# Patient Record
Sex: MALE | Race: ASIAN | NOT HISPANIC OR LATINO
[De-identification: names, ages, dates, MRNs, and addresses within clinical notes are randomized per-mention and may not be internally consistent; named-entity substitution may affect disease eponyms.]

---

## 2023-11-07 PROBLEM — Z00.00 ENCOUNTER FOR PREVENTIVE HEALTH EXAMINATION: Status: ACTIVE | Noted: 2023-11-07

## 2023-11-08 PROBLEM — K29.50 OTHER CHRONIC GASTRITIS: Status: ACTIVE | Noted: 2023-11-08

## 2023-11-08 RX ORDER — OMEPRAZOLE 40 MG/1
40 CAPSULE, DELAYED RELEASE ORAL
Qty: 30 | Refills: 0 | Status: ACTIVE | COMMUNITY

## 2023-11-08 RX ORDER — SUCRALFATE 1 G/1
1 TABLET ORAL
Refills: 0 | Status: ACTIVE | COMMUNITY

## 2023-11-10 ENCOUNTER — APPOINTMENT (OUTPATIENT)
Dept: THORACIC SURGERY | Facility: CLINIC | Age: 63
End: 2023-11-10
Payer: MEDICAID

## 2023-11-10 ENCOUNTER — OUTPATIENT (OUTPATIENT)
Dept: OUTPATIENT SERVICES | Facility: HOSPITAL | Age: 63
LOS: 1 days | End: 2023-11-10
Payer: COMMERCIAL

## 2023-11-10 VITALS
OXYGEN SATURATION: 95 % | HEART RATE: 70 BPM | SYSTOLIC BLOOD PRESSURE: 108 MMHG | DIASTOLIC BLOOD PRESSURE: 74 MMHG | BODY MASS INDEX: 23.79 KG/M2 | WEIGHT: 157 LBS | HEIGHT: 68 IN | TEMPERATURE: 97.8 F | RESPIRATION RATE: 16 BRPM

## 2023-11-10 DIAGNOSIS — Z86.69 PERSONAL HISTORY OF OTHER DISEASES OF THE NERVOUS SYSTEM AND SENSE ORGANS: ICD-10-CM

## 2023-11-10 DIAGNOSIS — C16.9 MALIGNANT NEOPLASM OF STOMACH, UNSPECIFIED: ICD-10-CM

## 2023-11-10 DIAGNOSIS — Z87.891 PERSONAL HISTORY OF NICOTINE DEPENDENCE: ICD-10-CM

## 2023-11-10 DIAGNOSIS — Z01.818 ENCOUNTER FOR OTHER PREPROCEDURAL EXAMINATION: ICD-10-CM

## 2023-11-10 DIAGNOSIS — K29.50 UNSPECIFIED CHRONIC GASTRITIS W/OUT BLEEDING: ICD-10-CM

## 2023-11-10 LAB
ALBUMIN SERPL ELPH-MCNC: 4 G/DL — SIGNIFICANT CHANGE UP (ref 3.3–5)
ALBUMIN SERPL ELPH-MCNC: 4 G/DL — SIGNIFICANT CHANGE UP (ref 3.3–5)
ALP SERPL-CCNC: 56 U/L — SIGNIFICANT CHANGE UP (ref 40–120)
ALP SERPL-CCNC: 56 U/L — SIGNIFICANT CHANGE UP (ref 40–120)
ALT FLD-CCNC: 15 U/L — SIGNIFICANT CHANGE UP (ref 10–45)
ALT FLD-CCNC: 15 U/L — SIGNIFICANT CHANGE UP (ref 10–45)
ANION GAP SERPL CALC-SCNC: 9 MMOL/L — SIGNIFICANT CHANGE UP (ref 5–17)
ANION GAP SERPL CALC-SCNC: 9 MMOL/L — SIGNIFICANT CHANGE UP (ref 5–17)
APPEARANCE UR: CLEAR — SIGNIFICANT CHANGE UP
APPEARANCE UR: CLEAR — SIGNIFICANT CHANGE UP
APTT BLD: 32.7 SEC — SIGNIFICANT CHANGE UP (ref 24.5–35.6)
APTT BLD: 32.7 SEC — SIGNIFICANT CHANGE UP (ref 24.5–35.6)
AST SERPL-CCNC: 24 U/L — SIGNIFICANT CHANGE UP (ref 10–40)
AST SERPL-CCNC: 24 U/L — SIGNIFICANT CHANGE UP (ref 10–40)
BASOPHILS # BLD AUTO: 0.06 K/UL — SIGNIFICANT CHANGE UP (ref 0–0.2)
BASOPHILS # BLD AUTO: 0.06 K/UL — SIGNIFICANT CHANGE UP (ref 0–0.2)
BASOPHILS NFR BLD AUTO: 1.1 % — SIGNIFICANT CHANGE UP (ref 0–2)
BASOPHILS NFR BLD AUTO: 1.1 % — SIGNIFICANT CHANGE UP (ref 0–2)
BILIRUB SERPL-MCNC: 0.6 MG/DL — SIGNIFICANT CHANGE UP (ref 0.2–1.2)
BILIRUB SERPL-MCNC: 0.6 MG/DL — SIGNIFICANT CHANGE UP (ref 0.2–1.2)
BILIRUB UR-MCNC: NEGATIVE — SIGNIFICANT CHANGE UP
BILIRUB UR-MCNC: NEGATIVE — SIGNIFICANT CHANGE UP
BUN SERPL-MCNC: 14 MG/DL — SIGNIFICANT CHANGE UP (ref 7–23)
BUN SERPL-MCNC: 14 MG/DL — SIGNIFICANT CHANGE UP (ref 7–23)
CALCIUM SERPL-MCNC: 8.9 MG/DL — SIGNIFICANT CHANGE UP (ref 8.4–10.5)
CALCIUM SERPL-MCNC: 8.9 MG/DL — SIGNIFICANT CHANGE UP (ref 8.4–10.5)
CHLORIDE SERPL-SCNC: 103 MMOL/L — SIGNIFICANT CHANGE UP (ref 96–108)
CHLORIDE SERPL-SCNC: 103 MMOL/L — SIGNIFICANT CHANGE UP (ref 96–108)
CO2 SERPL-SCNC: 28 MMOL/L — SIGNIFICANT CHANGE UP (ref 22–31)
CO2 SERPL-SCNC: 28 MMOL/L — SIGNIFICANT CHANGE UP (ref 22–31)
COLOR SPEC: YELLOW — SIGNIFICANT CHANGE UP
COLOR SPEC: YELLOW — SIGNIFICANT CHANGE UP
CREAT SERPL-MCNC: 0.81 MG/DL — SIGNIFICANT CHANGE UP (ref 0.5–1.3)
CREAT SERPL-MCNC: 0.81 MG/DL — SIGNIFICANT CHANGE UP (ref 0.5–1.3)
DIFF PNL FLD: NEGATIVE — SIGNIFICANT CHANGE UP
DIFF PNL FLD: NEGATIVE — SIGNIFICANT CHANGE UP
EGFR: 99 ML/MIN/1.73M2 — SIGNIFICANT CHANGE UP
EGFR: 99 ML/MIN/1.73M2 — SIGNIFICANT CHANGE UP
EOSINOPHIL # BLD AUTO: 0.11 K/UL — SIGNIFICANT CHANGE UP (ref 0–0.5)
EOSINOPHIL # BLD AUTO: 0.11 K/UL — SIGNIFICANT CHANGE UP (ref 0–0.5)
EOSINOPHIL NFR BLD AUTO: 2 % — SIGNIFICANT CHANGE UP (ref 0–6)
EOSINOPHIL NFR BLD AUTO: 2 % — SIGNIFICANT CHANGE UP (ref 0–6)
GLUCOSE SERPL-MCNC: 77 MG/DL — SIGNIFICANT CHANGE UP (ref 70–99)
GLUCOSE SERPL-MCNC: 77 MG/DL — SIGNIFICANT CHANGE UP (ref 70–99)
GLUCOSE UR QL: NEGATIVE MG/DL — SIGNIFICANT CHANGE UP
GLUCOSE UR QL: NEGATIVE MG/DL — SIGNIFICANT CHANGE UP
HCT VFR BLD CALC: 45.4 % — SIGNIFICANT CHANGE UP (ref 39–50)
HCT VFR BLD CALC: 45.4 % — SIGNIFICANT CHANGE UP (ref 39–50)
HGB BLD-MCNC: 15.2 G/DL — SIGNIFICANT CHANGE UP (ref 13–17)
HGB BLD-MCNC: 15.2 G/DL — SIGNIFICANT CHANGE UP (ref 13–17)
IMM GRANULOCYTES NFR BLD AUTO: 0.5 % — SIGNIFICANT CHANGE UP (ref 0–0.9)
IMM GRANULOCYTES NFR BLD AUTO: 0.5 % — SIGNIFICANT CHANGE UP (ref 0–0.9)
INR BLD: 0.95 — SIGNIFICANT CHANGE UP (ref 0.85–1.18)
INR BLD: 0.95 — SIGNIFICANT CHANGE UP (ref 0.85–1.18)
KETONES UR-MCNC: NEGATIVE MG/DL — SIGNIFICANT CHANGE UP
KETONES UR-MCNC: NEGATIVE MG/DL — SIGNIFICANT CHANGE UP
LEUKOCYTE ESTERASE UR-ACNC: NEGATIVE — SIGNIFICANT CHANGE UP
LEUKOCYTE ESTERASE UR-ACNC: NEGATIVE — SIGNIFICANT CHANGE UP
LYMPHOCYTES # BLD AUTO: 1.21 K/UL — SIGNIFICANT CHANGE UP (ref 1–3.3)
LYMPHOCYTES # BLD AUTO: 1.21 K/UL — SIGNIFICANT CHANGE UP (ref 1–3.3)
LYMPHOCYTES # BLD AUTO: 21.8 % — SIGNIFICANT CHANGE UP (ref 13–44)
LYMPHOCYTES # BLD AUTO: 21.8 % — SIGNIFICANT CHANGE UP (ref 13–44)
MCHC RBC-ENTMCNC: 31.5 PG — SIGNIFICANT CHANGE UP (ref 27–34)
MCHC RBC-ENTMCNC: 31.5 PG — SIGNIFICANT CHANGE UP (ref 27–34)
MCHC RBC-ENTMCNC: 33.5 GM/DL — SIGNIFICANT CHANGE UP (ref 32–36)
MCHC RBC-ENTMCNC: 33.5 GM/DL — SIGNIFICANT CHANGE UP (ref 32–36)
MCV RBC AUTO: 94 FL — SIGNIFICANT CHANGE UP (ref 80–100)
MCV RBC AUTO: 94 FL — SIGNIFICANT CHANGE UP (ref 80–100)
MONOCYTES # BLD AUTO: 0.45 K/UL — SIGNIFICANT CHANGE UP (ref 0–0.9)
MONOCYTES # BLD AUTO: 0.45 K/UL — SIGNIFICANT CHANGE UP (ref 0–0.9)
MONOCYTES NFR BLD AUTO: 8.1 % — SIGNIFICANT CHANGE UP (ref 2–14)
MONOCYTES NFR BLD AUTO: 8.1 % — SIGNIFICANT CHANGE UP (ref 2–14)
NEUTROPHILS # BLD AUTO: 3.7 K/UL — SIGNIFICANT CHANGE UP (ref 1.8–7.4)
NEUTROPHILS # BLD AUTO: 3.7 K/UL — SIGNIFICANT CHANGE UP (ref 1.8–7.4)
NEUTROPHILS NFR BLD AUTO: 66.5 % — SIGNIFICANT CHANGE UP (ref 43–77)
NEUTROPHILS NFR BLD AUTO: 66.5 % — SIGNIFICANT CHANGE UP (ref 43–77)
NITRITE UR-MCNC: NEGATIVE — SIGNIFICANT CHANGE UP
NITRITE UR-MCNC: NEGATIVE — SIGNIFICANT CHANGE UP
NRBC # BLD: 0 /100 WBCS — SIGNIFICANT CHANGE UP (ref 0–0)
NRBC # BLD: 0 /100 WBCS — SIGNIFICANT CHANGE UP (ref 0–0)
PH UR: 8 — SIGNIFICANT CHANGE UP (ref 5–8)
PH UR: 8 — SIGNIFICANT CHANGE UP (ref 5–8)
PLATELET # BLD AUTO: 170 K/UL — SIGNIFICANT CHANGE UP (ref 150–400)
PLATELET # BLD AUTO: 170 K/UL — SIGNIFICANT CHANGE UP (ref 150–400)
POTASSIUM SERPL-MCNC: 3.6 MMOL/L — SIGNIFICANT CHANGE UP (ref 3.5–5.3)
POTASSIUM SERPL-MCNC: 3.6 MMOL/L — SIGNIFICANT CHANGE UP (ref 3.5–5.3)
POTASSIUM SERPL-SCNC: 3.6 MMOL/L — SIGNIFICANT CHANGE UP (ref 3.5–5.3)
POTASSIUM SERPL-SCNC: 3.6 MMOL/L — SIGNIFICANT CHANGE UP (ref 3.5–5.3)
PROT SERPL-MCNC: 7.2 G/DL — SIGNIFICANT CHANGE UP (ref 6–8.3)
PROT SERPL-MCNC: 7.2 G/DL — SIGNIFICANT CHANGE UP (ref 6–8.3)
PROT UR-MCNC: NEGATIVE MG/DL — SIGNIFICANT CHANGE UP
PROT UR-MCNC: NEGATIVE MG/DL — SIGNIFICANT CHANGE UP
PROTHROM AB SERPL-ACNC: 10.8 SEC — SIGNIFICANT CHANGE UP (ref 9.5–13)
PROTHROM AB SERPL-ACNC: 10.8 SEC — SIGNIFICANT CHANGE UP (ref 9.5–13)
RBC # BLD: 4.83 M/UL — SIGNIFICANT CHANGE UP (ref 4.2–5.8)
RBC # BLD: 4.83 M/UL — SIGNIFICANT CHANGE UP (ref 4.2–5.8)
RBC # FLD: 12.4 % — SIGNIFICANT CHANGE UP (ref 10.3–14.5)
RBC # FLD: 12.4 % — SIGNIFICANT CHANGE UP (ref 10.3–14.5)
SODIUM SERPL-SCNC: 140 MMOL/L — SIGNIFICANT CHANGE UP (ref 135–145)
SODIUM SERPL-SCNC: 140 MMOL/L — SIGNIFICANT CHANGE UP (ref 135–145)
SP GR SPEC: 1 — SIGNIFICANT CHANGE UP (ref 1–1.03)
SP GR SPEC: 1 — SIGNIFICANT CHANGE UP (ref 1–1.03)
UROBILINOGEN FLD QL: 0.2 MG/DL — SIGNIFICANT CHANGE UP (ref 0.2–1)
UROBILINOGEN FLD QL: 0.2 MG/DL — SIGNIFICANT CHANGE UP (ref 0.2–1)
WBC # BLD: 5.56 K/UL — SIGNIFICANT CHANGE UP (ref 3.8–10.5)
WBC # BLD: 5.56 K/UL — SIGNIFICANT CHANGE UP (ref 3.8–10.5)
WBC # FLD AUTO: 5.56 K/UL — SIGNIFICANT CHANGE UP (ref 3.8–10.5)
WBC # FLD AUTO: 5.56 K/UL — SIGNIFICANT CHANGE UP (ref 3.8–10.5)

## 2023-11-10 PROCEDURE — 81003 URINALYSIS AUTO W/O SCOPE: CPT

## 2023-11-10 PROCEDURE — 85610 PROTHROMBIN TIME: CPT

## 2023-11-10 PROCEDURE — 85025 COMPLETE CBC W/AUTO DIFF WBC: CPT

## 2023-11-10 PROCEDURE — 86901 BLOOD TYPING SEROLOGIC RH(D): CPT

## 2023-11-10 PROCEDURE — 36415 COLL VENOUS BLD VENIPUNCTURE: CPT

## 2023-11-10 PROCEDURE — 99205 OFFICE O/P NEW HI 60 MIN: CPT

## 2023-11-10 PROCEDURE — 80053 COMPREHEN METABOLIC PANEL: CPT

## 2023-11-10 PROCEDURE — 86900 BLOOD TYPING SEROLOGIC ABO: CPT

## 2023-11-10 PROCEDURE — 86850 RBC ANTIBODY SCREEN: CPT

## 2023-11-10 PROCEDURE — 85730 THROMBOPLASTIN TIME PARTIAL: CPT

## 2023-11-22 NOTE — H&P ADULT - NSHPREVIEWOFSYSTEMS_GEN_ALL_CORE
Gastrointestinal: abdominal pain and heartburn, but no vomiting, no constipation, no diarrhea and no melena.    Constitutional, Respiratory, Gastrointestinal, Musculoskeletal and Psychiatric are otherwise negative.  ?

## 2023-11-22 NOTE — H&P ADULT - ASSESSMENT
BOBO DODSON is a 62-year-old M, nonsmoker, with PMHx of myasthenia gravis, s/p right video-assisted thoracoscopic surgery for thymectomy in 2018. As per patient, post-op was complicated with respiratory failure s/p tracheotomy and PFG placement. He is referred by Dr. Robert Bang for an initial evaluation of newly diagnosed gastric adenocarcinoma at [facility]. Pt was referred to Dr. Byrd for staging.  ?  Patient c/o epigastric pain, worsening when he is fasting, also reported a sense of fullness, even with small meals. Denied vomiting, nausea, unintentional weight loss or fatigue.  ?  I have independently reviewed the medical records and imaging at the time of this office consultation and discussed the following interpretations with the patient. Based on the EUS, it is an early-stage gastric cancer and surgical gastrectomy is the recommendation. Will plan for surgical resection which would entail an EGD, laparoscopic robotic assisted gastrectomy and reconstruction with possible J-tube placement.  ?   The patient was counseled on the risks, benefits and alternatives of proceeding with gastrectomy. Specifically, the postoperative recovery and dietary modifications, including need for a feeding tube, tube feeds for nutrition. Patient understands and agrees to the above.

## 2023-11-22 NOTE — H&P ADULT - NSHPSOCIALHISTORY_GEN_ALL_CORE
Former smoker (V15.82) (Z87.891)  No alcohol use  No illicit drug use    Occupation: retired [employer]  Patient lives with family

## 2023-11-22 NOTE — H&P ADULT - HISTORY OF PRESENT ILLNESS
BOBO DODSON is a 62-year-old M, nonsmoker, with PMHx of myasthenia gravis, s/p right video-assisted thoracoscopic surgery for thymectomy in 2018. As per patient, post-op was complicated with respiratory failure s/p tracheotomy and PFG placement. He is referred by Dr. Robert Bang for an initial evaluation of newly diagnosed gastric adenocarcinoma at [facility]. Pt was referred to Dr. Byrd for staging.  ?  Patient c/o epigastric pain, worsening when he is fasting, also reported a sense of fullness, even with small meals. Denied vomiting, nausea, unintentional weight loss or fatigue.

## 2023-11-22 NOTE — H&P ADULT - NSHPLABSRESULTS_GEN_ALL_CORE
EGD on 10/18/2023  - GE junction at 39cm from the incisors  - mild erythema and irregular Z-line in the GEJ compatible with non-erosive esophagitis  - Moderate irregular in the antrum compatible with gastritis  - Ulcer in the incisura of the stomach  Pathology on 10/18/23  - Antrum: mild chronic gastritis with extensive intestinal metaplasia  - Incisura: intramucosal adenocarcinoma. Low and high grade dysplasia also noted. Intestinal metaplasia present. Moderate chronic active inflammation and ulcer.  ?  EUS on 11/07/23 demonstrated T1BN0 gastric cancer.

## 2023-11-22 NOTE — H&P ADULT - REASON FOR ADMISSION
elective surgery: EGD, laparoscopic robotic assisted gastrectomy and reconstruction with possible J-tube placement

## 2023-11-22 NOTE — H&P ADULT - NSHPOUTPATIENTPROVIDERS_GEN_ALL_CORE
REF/GASTRO: DR. ELKE PAT   P:[phone]    PCP:  AMANDA HINOJOSA [facility]  [phone]

## 2023-11-22 NOTE — H&P ADULT - NSHPPHYSICALEXAM_GEN_ALL_CORE
weight = 157 lbs    ECOG Performance Status: Status 0 - Fully active, able to carry on all pre-disease performance without restriction.     Constitutional: alert and in no acute distress.  Neck: the appearance of the neck was normal, no neck mass was observed, the thyroid was not enlarged and there were no palpable thyroid nodules . there was no jugular-venous distention.  Pulmonary: no respiratory distress and lungs were clear to auscultation bilaterally.  Heart: heart rate was normal and rhythm regular, normal S1 and S2, no gallops, no murmurs and no pericardial rub.  Chest: the chest was normal in appearance, no chest asymmetry and normal chest expansion.  Neurological: deep tendon reflexes were 2+ and symmetric, the sensory exam was normal to light touch and pinprick and no focal deficits.  Psychiatric: oriented to person, place, and time, insight and judgment were intact and the affect was normal.

## 2023-11-22 NOTE — H&P ADULT - NSICDXPASTSURGICALHX_GEN_ALL_CORE_FT
PAST SURGICAL HISTORY:  H/O tracheostomy     Myasthenia gravis status post thymectomy     PEG (percutaneous endoscopic gastrostomy) status

## 2023-11-28 ENCOUNTER — OUTPATIENT (OUTPATIENT)
Dept: OUTPATIENT SERVICES | Facility: HOSPITAL | Age: 63
LOS: 1 days | End: 2023-11-28
Payer: COMMERCIAL

## 2023-11-28 DIAGNOSIS — Z98.890 OTHER SPECIFIED POSTPROCEDURAL STATES: Chronic | ICD-10-CM

## 2023-11-28 DIAGNOSIS — G70.00 MYASTHENIA GRAVIS WITHOUT (ACUTE) EXACERBATION: Chronic | ICD-10-CM

## 2023-11-28 DIAGNOSIS — C16.9 MALIGNANT NEOPLASM OF STOMACH, UNSPECIFIED: ICD-10-CM

## 2023-11-28 DIAGNOSIS — Z93.1 GASTROSTOMY STATUS: Chronic | ICD-10-CM

## 2023-11-29 ENCOUNTER — RESULT REVIEW (OUTPATIENT)
Age: 63
End: 2023-11-29

## 2023-11-29 PROBLEM — Z86.69 PERSONAL HISTORY OF OTHER DISEASES OF THE NERVOUS SYSTEM AND SENSE ORGANS: Chronic | Status: ACTIVE | Noted: 2023-11-22

## 2023-11-29 PROCEDURE — 88321 CONSLTJ&REPRT SLD PREP ELSWR: CPT

## 2023-11-30 ENCOUNTER — TRANSCRIPTION ENCOUNTER (OUTPATIENT)
Age: 63
End: 2023-11-30

## 2023-11-30 VITALS
WEIGHT: 153.44 LBS | SYSTOLIC BLOOD PRESSURE: 109 MMHG | TEMPERATURE: 97 F | HEART RATE: 69 BPM | OXYGEN SATURATION: 98 % | DIASTOLIC BLOOD PRESSURE: 75 MMHG | RESPIRATION RATE: 16 BRPM | HEIGHT: 68 IN

## 2023-11-30 LAB
SURGICAL PATHOLOGY STUDY: SIGNIFICANT CHANGE UP
SURGICAL PATHOLOGY STUDY: SIGNIFICANT CHANGE UP

## 2023-11-30 NOTE — PATIENT PROFILE ADULT - NSTRANSFERBELONGINGSDISPO_GEN_A_NUR
Implemented All Universal Safety Interventions:  [place] to call system. Call bell, personal items and telephone within reach. Instruct patient to call for assistance. Room bathroom lighting operational. Non-slip footwear when patient is off stretcher. Physically safe environment: no spills, clutter or unnecessary equipment. Stretcher in lowest position, wheels locked, appropriate side rails in place.
given to family

## 2023-12-01 ENCOUNTER — APPOINTMENT (OUTPATIENT)
Dept: THORACIC SURGERY | Facility: HOSPITAL | Age: 63
End: 2023-12-01

## 2023-12-01 ENCOUNTER — RESULT REVIEW (OUTPATIENT)
Age: 63
End: 2023-12-01

## 2023-12-01 ENCOUNTER — INPATIENT (INPATIENT)
Facility: HOSPITAL | Age: 63
LOS: 3 days | Discharge: HOME | DRG: 328 | End: 2023-12-05
Attending: THORACIC SURGERY (CARDIOTHORACIC VASCULAR SURGERY) | Admitting: THORACIC SURGERY (CARDIOTHORACIC VASCULAR SURGERY)
Payer: COMMERCIAL

## 2023-12-01 DIAGNOSIS — Z98.890 OTHER SPECIFIED POSTPROCEDURAL STATES: Chronic | ICD-10-CM

## 2023-12-01 DIAGNOSIS — Z93.1 GASTROSTOMY STATUS: Chronic | ICD-10-CM

## 2023-12-01 DIAGNOSIS — G70.00 MYASTHENIA GRAVIS WITHOUT (ACUTE) EXACERBATION: Chronic | ICD-10-CM

## 2023-12-01 LAB
ANION GAP SERPL CALC-SCNC: 8 MMOL/L — SIGNIFICANT CHANGE UP (ref 5–17)
ANION GAP SERPL CALC-SCNC: 8 MMOL/L — SIGNIFICANT CHANGE UP (ref 5–17)
APTT BLD: 39.3 SEC — HIGH (ref 24.5–35.6)
APTT BLD: 39.3 SEC — HIGH (ref 24.5–35.6)
BASOPHILS # BLD AUTO: 0.02 K/UL — SIGNIFICANT CHANGE UP (ref 0–0.2)
BASOPHILS # BLD AUTO: 0.02 K/UL — SIGNIFICANT CHANGE UP (ref 0–0.2)
BASOPHILS NFR BLD AUTO: 0.2 % — SIGNIFICANT CHANGE UP (ref 0–2)
BASOPHILS NFR BLD AUTO: 0.2 % — SIGNIFICANT CHANGE UP (ref 0–2)
BLD GP AB SCN SERPL QL: NEGATIVE — SIGNIFICANT CHANGE UP
BLD GP AB SCN SERPL QL: NEGATIVE — SIGNIFICANT CHANGE UP
BUN SERPL-MCNC: 15 MG/DL — SIGNIFICANT CHANGE UP (ref 7–23)
BUN SERPL-MCNC: 15 MG/DL — SIGNIFICANT CHANGE UP (ref 7–23)
CALCIUM SERPL-MCNC: 8 MG/DL — LOW (ref 8.4–10.5)
CALCIUM SERPL-MCNC: 8 MG/DL — LOW (ref 8.4–10.5)
CHLORIDE SERPL-SCNC: 105 MMOL/L — SIGNIFICANT CHANGE UP (ref 96–108)
CHLORIDE SERPL-SCNC: 105 MMOL/L — SIGNIFICANT CHANGE UP (ref 96–108)
CO2 SERPL-SCNC: 25 MMOL/L — SIGNIFICANT CHANGE UP (ref 22–31)
CO2 SERPL-SCNC: 25 MMOL/L — SIGNIFICANT CHANGE UP (ref 22–31)
CREAT SERPL-MCNC: 0.89 MG/DL — SIGNIFICANT CHANGE UP (ref 0.5–1.3)
CREAT SERPL-MCNC: 0.89 MG/DL — SIGNIFICANT CHANGE UP (ref 0.5–1.3)
EGFR: 96 ML/MIN/1.73M2 — SIGNIFICANT CHANGE UP
EGFR: 96 ML/MIN/1.73M2 — SIGNIFICANT CHANGE UP
EOSINOPHIL # BLD AUTO: 0 K/UL — SIGNIFICANT CHANGE UP (ref 0–0.5)
EOSINOPHIL # BLD AUTO: 0 K/UL — SIGNIFICANT CHANGE UP (ref 0–0.5)
EOSINOPHIL NFR BLD AUTO: 0 % — SIGNIFICANT CHANGE UP (ref 0–6)
EOSINOPHIL NFR BLD AUTO: 0 % — SIGNIFICANT CHANGE UP (ref 0–6)
GLUCOSE SERPL-MCNC: 120 MG/DL — HIGH (ref 70–99)
GLUCOSE SERPL-MCNC: 120 MG/DL — HIGH (ref 70–99)
HCT VFR BLD CALC: 42.2 % — SIGNIFICANT CHANGE UP (ref 39–50)
HCT VFR BLD CALC: 42.2 % — SIGNIFICANT CHANGE UP (ref 39–50)
HGB BLD-MCNC: 14.6 G/DL — SIGNIFICANT CHANGE UP (ref 13–17)
HGB BLD-MCNC: 14.6 G/DL — SIGNIFICANT CHANGE UP (ref 13–17)
IMM GRANULOCYTES NFR BLD AUTO: 0.4 % — SIGNIFICANT CHANGE UP (ref 0–0.9)
IMM GRANULOCYTES NFR BLD AUTO: 0.4 % — SIGNIFICANT CHANGE UP (ref 0–0.9)
INR BLD: 1.03 — SIGNIFICANT CHANGE UP (ref 0.85–1.18)
INR BLD: 1.03 — SIGNIFICANT CHANGE UP (ref 0.85–1.18)
LYMPHOCYTES # BLD AUTO: 0.57 K/UL — LOW (ref 1–3.3)
LYMPHOCYTES # BLD AUTO: 0.57 K/UL — LOW (ref 1–3.3)
LYMPHOCYTES # BLD AUTO: 4.8 % — LOW (ref 13–44)
LYMPHOCYTES # BLD AUTO: 4.8 % — LOW (ref 13–44)
MCHC RBC-ENTMCNC: 31.9 PG — SIGNIFICANT CHANGE UP (ref 27–34)
MCHC RBC-ENTMCNC: 31.9 PG — SIGNIFICANT CHANGE UP (ref 27–34)
MCHC RBC-ENTMCNC: 34.6 GM/DL — SIGNIFICANT CHANGE UP (ref 32–36)
MCHC RBC-ENTMCNC: 34.6 GM/DL — SIGNIFICANT CHANGE UP (ref 32–36)
MCV RBC AUTO: 92.3 FL — SIGNIFICANT CHANGE UP (ref 80–100)
MCV RBC AUTO: 92.3 FL — SIGNIFICANT CHANGE UP (ref 80–100)
MONOCYTES # BLD AUTO: 0.06 K/UL — SIGNIFICANT CHANGE UP (ref 0–0.9)
MONOCYTES # BLD AUTO: 0.06 K/UL — SIGNIFICANT CHANGE UP (ref 0–0.9)
MONOCYTES NFR BLD AUTO: 0.5 % — LOW (ref 2–14)
MONOCYTES NFR BLD AUTO: 0.5 % — LOW (ref 2–14)
NEUTROPHILS # BLD AUTO: 11.27 K/UL — HIGH (ref 1.8–7.4)
NEUTROPHILS # BLD AUTO: 11.27 K/UL — HIGH (ref 1.8–7.4)
NEUTROPHILS NFR BLD AUTO: 94.1 % — HIGH (ref 43–77)
NEUTROPHILS NFR BLD AUTO: 94.1 % — HIGH (ref 43–77)
NRBC # BLD: 0 /100 WBCS — SIGNIFICANT CHANGE UP (ref 0–0)
NRBC # BLD: 0 /100 WBCS — SIGNIFICANT CHANGE UP (ref 0–0)
PLATELET # BLD AUTO: 204 K/UL — SIGNIFICANT CHANGE UP (ref 150–400)
PLATELET # BLD AUTO: 204 K/UL — SIGNIFICANT CHANGE UP (ref 150–400)
POTASSIUM SERPL-MCNC: 4.4 MMOL/L — SIGNIFICANT CHANGE UP (ref 3.5–5.3)
POTASSIUM SERPL-MCNC: 4.4 MMOL/L — SIGNIFICANT CHANGE UP (ref 3.5–5.3)
POTASSIUM SERPL-SCNC: 4.4 MMOL/L — SIGNIFICANT CHANGE UP (ref 3.5–5.3)
POTASSIUM SERPL-SCNC: 4.4 MMOL/L — SIGNIFICANT CHANGE UP (ref 3.5–5.3)
PROTHROM AB SERPL-ACNC: 11.7 SEC — SIGNIFICANT CHANGE UP (ref 9.5–13)
PROTHROM AB SERPL-ACNC: 11.7 SEC — SIGNIFICANT CHANGE UP (ref 9.5–13)
RBC # BLD: 4.57 M/UL — SIGNIFICANT CHANGE UP (ref 4.2–5.8)
RBC # BLD: 4.57 M/UL — SIGNIFICANT CHANGE UP (ref 4.2–5.8)
RBC # FLD: 11.9 % — SIGNIFICANT CHANGE UP (ref 10.3–14.5)
RBC # FLD: 11.9 % — SIGNIFICANT CHANGE UP (ref 10.3–14.5)
RH IG SCN BLD-IMP: POSITIVE — SIGNIFICANT CHANGE UP
RH IG SCN BLD-IMP: POSITIVE — SIGNIFICANT CHANGE UP
SODIUM SERPL-SCNC: 138 MMOL/L — SIGNIFICANT CHANGE UP (ref 135–145)
SODIUM SERPL-SCNC: 138 MMOL/L — SIGNIFICANT CHANGE UP (ref 135–145)
WBC # BLD: 11.97 K/UL — HIGH (ref 3.8–10.5)
WBC # BLD: 11.97 K/UL — HIGH (ref 3.8–10.5)
WBC # FLD AUTO: 11.97 K/UL — HIGH (ref 3.8–10.5)
WBC # FLD AUTO: 11.97 K/UL — HIGH (ref 3.8–10.5)

## 2023-12-01 PROCEDURE — 38747 REMOVE ABDOMINAL LYMPH NODES: CPT | Mod: 59

## 2023-12-01 PROCEDURE — 88305 TISSUE EXAM BY PATHOLOGIST: CPT | Mod: 26

## 2023-12-01 PROCEDURE — 38747 REMOVE ABDOMINAL LYMPH NODES: CPT | Mod: AS,59

## 2023-12-01 PROCEDURE — 99231 SBSQ HOSP IP/OBS SF/LOW 25: CPT

## 2023-12-01 PROCEDURE — S2900 ROBOTIC SURGICAL SYSTEM: CPT | Mod: NC

## 2023-12-01 PROCEDURE — 88309 TISSUE EXAM BY PATHOLOGIST: CPT | Mod: 26

## 2023-12-01 PROCEDURE — 43633 REMOVAL OF STOMACH PARTIAL: CPT | Mod: AS

## 2023-12-01 PROCEDURE — 88331 PATH CONSLTJ SURG 1 BLK 1SPC: CPT | Mod: 26

## 2023-12-01 PROCEDURE — 71045 X-RAY EXAM CHEST 1 VIEW: CPT | Mod: 26

## 2023-12-01 PROCEDURE — 43633 REMOVAL OF STOMACH PARTIAL: CPT

## 2023-12-01 PROCEDURE — 43235 EGD DIAGNOSTIC BRUSH WASH: CPT | Mod: 59

## 2023-12-01 PROCEDURE — 99222 1ST HOSP IP/OBS MODERATE 55: CPT

## 2023-12-01 DEVICE — STAPLER COVIDIEN TRI-STAPLE 60MM PURPLE INTELLIGENT RELOAD: Type: IMPLANTABLE DEVICE | Site: ABDOMINAL | Status: FUNCTIONAL

## 2023-12-01 DEVICE — STAPLER COVIDIEN TRI-STAPLE 60MM TAN RELOAD: Type: IMPLANTABLE DEVICE | Site: ABDOMINAL | Status: FUNCTIONAL

## 2023-12-01 DEVICE — STAPLER COVIDIEN TRI-STAPLE 60MM PURPLE RELOAD: Type: IMPLANTABLE DEVICE | Site: ABDOMINAL | Status: FUNCTIONAL

## 2023-12-01 DEVICE — STAPLER COVIDIEN TRI-STAPLE 45MM TAN RELOAD: Type: IMPLANTABLE DEVICE | Site: ABDOMINAL | Status: FUNCTIONAL

## 2023-12-01 RX ORDER — OMEPRAZOLE 10 MG/1
1 CAPSULE, DELAYED RELEASE ORAL
Refills: 0 | DISCHARGE

## 2023-12-01 RX ORDER — SODIUM CHLORIDE 9 MG/ML
1000 INJECTION, SOLUTION INTRAVENOUS
Refills: 0 | Status: DISCONTINUED | OUTPATIENT
Start: 2023-12-01 | End: 2023-12-03

## 2023-12-01 RX ORDER — CHOLECALCIFEROL (VITAMIN D3) 125 MCG
0 CAPSULE ORAL
Refills: 0 | DISCHARGE

## 2023-12-01 RX ORDER — MYCOPHENOLATE MOFETIL 250 MG/1
1000 CAPSULE ORAL DAILY
Refills: 0 | Status: DISCONTINUED | OUTPATIENT
Start: 2023-12-02 | End: 2023-12-04

## 2023-12-01 RX ORDER — MYCOPHENOLATE MOFETIL 250 MG/1
4 CAPSULE ORAL
Refills: 0 | DISCHARGE

## 2023-12-01 RX ORDER — HYDROMORPHONE HYDROCHLORIDE 2 MG/ML
0.5 INJECTION INTRAMUSCULAR; INTRAVENOUS; SUBCUTANEOUS EVERY 4 HOURS
Refills: 0 | Status: DISCONTINUED | OUTPATIENT
Start: 2023-12-01 | End: 2023-12-04

## 2023-12-01 RX ORDER — HEPARIN SODIUM 5000 [USP'U]/ML
5000 INJECTION INTRAVENOUS; SUBCUTANEOUS EVERY 8 HOURS
Refills: 0 | Status: DISCONTINUED | OUTPATIENT
Start: 2023-12-01 | End: 2023-12-05

## 2023-12-01 RX ORDER — CEFAZOLIN SODIUM 1 G
2000 VIAL (EA) INJECTION EVERY 8 HOURS
Refills: 0 | Status: COMPLETED | OUTPATIENT
Start: 2023-12-01 | End: 2023-12-02

## 2023-12-01 RX ORDER — PANTOPRAZOLE SODIUM 20 MG/1
40 TABLET, DELAYED RELEASE ORAL DAILY
Refills: 0 | Status: DISCONTINUED | OUTPATIENT
Start: 2023-12-01 | End: 2023-12-04

## 2023-12-01 RX ORDER — LIDOCAINE 4 G/100G
1 CREAM TOPICAL DAILY
Refills: 0 | Status: DISCONTINUED | OUTPATIENT
Start: 2023-12-01 | End: 2023-12-05

## 2023-12-01 RX ORDER — HEPARIN SODIUM 5000 [USP'U]/ML
5000 INJECTION INTRAVENOUS; SUBCUTANEOUS ONCE
Refills: 0 | Status: COMPLETED | OUTPATIENT
Start: 2023-12-01 | End: 2023-12-01

## 2023-12-01 RX ORDER — ACETAMINOPHEN 500 MG
1000 TABLET ORAL ONCE
Refills: 0 | Status: COMPLETED | OUTPATIENT
Start: 2023-12-01 | End: 2023-12-02

## 2023-12-01 RX ORDER — KETOROLAC TROMETHAMINE 30 MG/ML
15 SYRINGE (ML) INJECTION EVERY 4 HOURS
Refills: 0 | Status: DISCONTINUED | OUTPATIENT
Start: 2023-12-01 | End: 2023-12-04

## 2023-12-01 RX ADMIN — HEPARIN SODIUM 5000 UNIT(S): 5000 INJECTION INTRAVENOUS; SUBCUTANEOUS at 07:00

## 2023-12-01 RX ADMIN — HEPARIN SODIUM 5000 UNIT(S): 5000 INJECTION INTRAVENOUS; SUBCUTANEOUS at 16:21

## 2023-12-01 RX ADMIN — LIDOCAINE 1 PATCH: 4 CREAM TOPICAL at 12:00

## 2023-12-01 RX ADMIN — PANTOPRAZOLE SODIUM 40 MILLIGRAM(S): 20 TABLET, DELAYED RELEASE ORAL at 11:59

## 2023-12-01 RX ADMIN — Medication 100 MILLIGRAM(S): at 16:17

## 2023-12-01 RX ADMIN — LIDOCAINE 1 PATCH: 4 CREAM TOPICAL at 18:39

## 2023-12-01 NOTE — CONSULT NOTE ADULT - ASSESSMENT
BOBO DODSON is a 62-year-old M, Mandarin speaking, nonsmoker, with PMHx of myasthenia gravis, s/p right video-assisted thoracoscopic surgery for thymectomy in 2018. As per patient, post-op was complicated with respiratory failure s/p tracheotomy and PEG placement. He is referred by GI, Dr. Elke Bang for an initial evaluation of newly diagnosed gastric adenocarcinoma at [facility]. Pt was referred to advance GI, Dr. Byrd for staging. Based on the EUS, it is an early-stage gastric cancer and surgical gastrectomy is the recommendation. Pt underwent an elective robotic assisted distal gastrectomy with Pato en Y reconstruction on 12/1/23.  POD 0     - 9LA for hemodynamic monitoring   - O2 supplement NC2L/IS as tolerated   - NPO/IVF with lactated ringers. 1000 milliLiter(s) IV Continuous <Continuous>  - postop iv abx: ceFAZolin   IVPB 2000 milliGRAM(s) IV Intermittent every 8 hours  - pain management   lidocaine   4% Patch 1 Patch Transdermal daily  acetaminophen   IVPB .. 1000 milliGRAM(s) IV Intermittent once PRN  ketorolac   Injectable 15 milliGRAM(s) IV Push every 4 hours PRN  HYDROmorphone  Injectable 0.5 milliGRAM(s) IV Push every 4 hours PRN  - GI ppx: pantoprazole  Injectable 40 milliGRAM(s) IV Push daily  - DVT ppx: heparin   Injectable 5000 Unit(s) SubCutaneous every 8 hours  - held oral home meds while NPO (mycophenolate mofetil 250 mg oral capsule: 4 cap(s) orally once a day (01 Dec 2023 06:40)     REF/GASTRO: DR. ELKE BANG   P:[phone]    PCP:  AMANDA [facility]  [phone]    Disposition: medically active     Thank you for the consult, will continue follow pt with you.

## 2023-12-01 NOTE — CONSULT NOTE ADULT - ASSESSMENT
63-year-old male who is a nonsmoker with PMHx of myasthenia gravis s/p right video-assisted thoracoscopic surgery for thymectomy in 2018 c/b respiratory failure that required tracheostomy and PEG (now reversed), and maintained on cellcept. Admitted for new adenocarcinoma and underwent partial gastrectomy on 12/1/23.     Neurology consulted for hx of myasthenia gravis and management as well as concerns for NPO status through Monday. Given that patient has been well controlled on cell-cept with normal exam, would continue to exercise precautionary measures with medications that could potentially exacerbate MG.     Recommendations:  - Continue Cellcept 1000mg IV QD  - Please avoid magnesium sulfate in MG patients  - Please avoid medications that could potentially exacerbate MG (such as some antibiotics, statins, and propanolol to name a few)  - Neurology will continue to follow, and please call us with any change in patient's neurological status    - NP Rumble (Case discussed w/ Dr Hilton)

## 2023-12-01 NOTE — BRIEF OPERATIVE NOTE - NSICDXBRIEFPROCEDURE_GEN_ALL_CORE_FT
PROCEDURES:  Distal partial gastrectomy 01-Dec-2023 10:54:59 Robotic assisted, with Pato en Y reconstruction. Candelaria Marrufo  EGD 01-Dec-2023 10:55:31  Candelaria Marrufo V

## 2023-12-01 NOTE — PROGRESS NOTE ADULT - ASSESSMENT
62-year-old M, nonsmoker, with PMHx of myasthenia gravis, s/p right video-assisted thoracoscopic surgery for thymectomy in 2018. As per patient, post-op was complicated with respiratory failure s/p tracheotomy and PFG placement. He is referred by Dr. Robert Bang for an initial evaluation of newly diagnosed gastric adenocarcinoma at [facility]. Pt was referred to Dr. Byrd for staging.Based on the EUS, it is an early-stage gastric cancer and surgical gastrectomy is the recommendation. On 12/01/2 he underwent, EGD, laparoscopic robotic assisted gastrectomy and Pato en Y reconstruction. Seen in the PACU, pain well controlled.     Neuro: pain management history of Myasthenia gravis.   - Tylenol, Toradol and Dilaudid PRN IV meds only.   -  Ask about restarting Mycephenalate. Consider neuro consult while in house.   - continue to monitor for diplopia, muscle weakness, difficulty breathing and difficulty swallowing.     CVS: s/p Thymectomy.   - continue patient on telemetry monitoring     Respiratory: Saturating well on NRB  - Wean off O2 sat > 92%  - IS and ambulation  - Chest PT.     GI: Strict NPO s/p partial gastrectomy.   - GI PPX with IV protonix.   - Strict NPO til Swallow study on Monday     : BUN/ Creatinine. 14/.89  +Swenson   - monitor I/Os.   - Continue to monitor UOP   - continue IV fluids     Endo:no history of DM.   - ISS PRN. patient is going ot NPO til monday     ID: WBC 11. afebrile.   - continue to monitor fever curve   - Ancef perioperatively.     Heme: DVT PPX   - SQH     Sophy Rivera PA-C

## 2023-12-01 NOTE — CONSULT NOTE ADULT - REASON FOR ADMISSION
elective surgery: EGD, laparoscopic robotic assisted gastrectomy and reconstruction with possible J-tube placement
elective surgery: EGD, laparoscopic robotic assisted gastrectomy and reconstruction with possible J-tube placement

## 2023-12-01 NOTE — CONSULT NOTE ADULT - NSCONSULTADDITIONALINFOA_GEN_ALL_CORE
pt was not seen by me but discussed with NP. if normal exam and no e/o respiratory distress, can hold off on further MG treatments and continue maintenance cellcept 1g daily. will staff in AM.

## 2023-12-01 NOTE — PROGRESS NOTE ADULT - SUBJECTIVE AND OBJECTIVE BOX
Patient discussed on morning rounds with Dr. WORKMAN    Operation / Date: 12/01/23: EGD, laparoscopic robotic assisted gastrectomy and Pato en Y reconstruction    SUBJECTIVE ASSESSMENT:  63y Male         Vital Signs Last 24 Hrs  T(C): 36.8 (01 Dec 2023 11:03), Max: 36.8 (01 Dec 2023 11:03)  T(F): 98.2 (01 Dec 2023 11:03), Max: 98.2 (01 Dec 2023 11:03)  HR: 69 (01 Dec 2023 12:23) (69 - 79)  BP: 113/68 (01 Dec 2023 12:23) (104/62 - 113/68)  BP(mean): 86 (01 Dec 2023 12:23) (77 - 86)  RR: 12 (01 Dec 2023 12:23) (12 - 18)  SpO2: 98% (01 Dec 2023 12:23) (97% - 99%)    Parameters below as of 01 Dec 2023 12:23  Patient On (Oxygen Delivery Method): nasal cannula  O2 Flow (L/min): 3    I&O's Detail    01 Dec 2023 07:01  -  01 Dec 2023 12:38  --------------------------------------------------------  IN:    Lactated Ringers: 120 mL  Total IN: 120 mL    OUT:  Total OUT: 0 mL    Total NET: 120 mL          CHEST TUBE:  Yes/No. AIR LEAKS: Yes/No. Suction / H2O SEAL.   JENNA DRAIN:  Yes/No.  EPICARDIAL WIRES: Yes/No.  TIE DOWNS: Yes/No.  STUART: Yes/No.    PHYSICAL EXAM:    General:     Neurological:    Cardiovascular:    Respiratory:    Gastrointestinal:    Extremities:    Vascular:    Incision Sites:    LABS:                        14.6   11.97 )-----------( 204      ( 01 Dec 2023 11:32 )             42.2       COUMADIN:  Yes/No. REASON: .    PT/INR - ( 01 Dec 2023 11:32 )   PT: 11.7 sec;   INR: 1.03          PTT - ( 01 Dec 2023 11:32 )  PTT:39.3 sec    12-01    138  |  105  |  15  ----------------------------<  120<H>  4.4   |  25  |  0.89    Ca    8.0<L>      01 Dec 2023 11:32        Urinalysis Basic - ( 01 Dec 2023 11:32 )    Color: x / Appearance: x / SG: x / pH: x  Gluc: 120 mg/dL / Ketone: x  / Bili: x / Urobili: x   Blood: x / Protein: x / Nitrite: x   Leuk Esterase: x / RBC: x / WBC x   Sq Epi: x / Non Sq Epi: x / Bacteria: x        MEDICATIONS  (STANDING):  ceFAZolin   IVPB 2000 milliGRAM(s) IV Intermittent every 8 hours  heparin   Injectable 5000 Unit(s) SubCutaneous every 8 hours  lactated ringers. 1000 milliLiter(s) (60 mL/Hr) IV Continuous <Continuous>  lidocaine   4% Patch 1 Patch Transdermal daily  pantoprazole  Injectable 40 milliGRAM(s) IV Push daily    MEDICATIONS  (PRN):  acetaminophen   IVPB .. 1000 milliGRAM(s) IV Intermittent once PRN Temp greater or equal to 38C (100.4F), Mild Pain (1 - 3)  HYDROmorphone  Injectable 0.5 milliGRAM(s) IV Push every 4 hours PRN Severe Pain (7 - 10)  ketorolac   Injectable 15 milliGRAM(s) IV Push every 4 hours PRN Moderate Pain (4 - 6)        RADIOLOGY & ADDITIONAL TESTS:     Patient discussed on morning rounds with Dr. WORKMAN    Operation / Date: 12/01/23: EGD, laparoscopic robotic assisted gastrectomy and Pato en Y reconstruction    SUBJECTIVE ASSESSMENT:  63y Male reports that his pain is well controlled. Complains of dry mouth. No other issues at this time.         Vital Signs Last 24 Hrs  T(C): 36.8 (01 Dec 2023 11:03), Max: 36.8 (01 Dec 2023 11:03)  T(F): 98.2 (01 Dec 2023 11:03), Max: 98.2 (01 Dec 2023 11:03)  HR: 69 (01 Dec 2023 12:23) (69 - 79)  BP: 113/68 (01 Dec 2023 12:23) (104/62 - 113/68)  BP(mean): 86 (01 Dec 2023 12:23) (77 - 86)  RR: 12 (01 Dec 2023 12:23) (12 - 18)  SpO2: 98% (01 Dec 2023 12:23) (97% - 99%)    Parameters below as of 01 Dec 2023 12:23  Patient On (Oxygen Delivery Method): nasal cannula  O2 Flow (L/min): 3    I&O's Detail    01 Dec 2023 07:01  -  01 Dec 2023 12:38  --------------------------------------------------------  IN:    Lactated Ringers: 120 mL  Total IN: 120 mL    OUT:  Total OUT: 0 mL    Total NET: 120 mL        PHYSICAL EXAM:    GEN: NAD, looks comfortable  Neuro: A&Ox3.  No focal deficits.  Moving all extremities.   HEENT: No obvious abnormalities  CV: S1S2, regular, no murmurs appreciated.  No carotid bruits.  No JVD  Lungs: Clear B/L.  No wheezing, rales or rhonchi  ABD: Soft, non-tender, non-distended.   decreased Bowel sounds  EXT: Warm and well perfused.  No peripheral edema noted. All Distal pulses palpable.   Musculoskeletal: Moving all extremities with normal ROM, no joint swelling  Incisions: Laparotomy incisions c/d/i      LABS:                        14.6   11.97 )-----------( 204      ( 01 Dec 2023 11:32 )             42.2         PT/INR - ( 01 Dec 2023 11:32 )   PT: 11.7 sec;   INR: 1.03          PTT - ( 01 Dec 2023 11:32 )  PTT:39.3 sec    12-01    138  |  105  |  15  ----------------------------<  120<H>  4.4   |  25  |  0.89    Ca    8.0<L>      01 Dec 2023 11:32        Urinalysis Basic - ( 01 Dec 2023 11:32 )    Color: x / Appearance: x / SG: x / pH: x  Gluc: 120 mg/dL / Ketone: x  / Bili: x / Urobili: x   Blood: x / Protein: x / Nitrite: x   Leuk Esterase: x / RBC: x / WBC x   Sq Epi: x / Non Sq Epi: x / Bacteria: x        MEDICATIONS  (STANDING):  ceFAZolin   IVPB 2000 milliGRAM(s) IV Intermittent every 8 hours  heparin   Injectable 5000 Unit(s) SubCutaneous every 8 hours  lactated ringers. 1000 milliLiter(s) (60 mL/Hr) IV Continuous <Continuous>  lidocaine   4% Patch 1 Patch Transdermal daily  pantoprazole  Injectable 40 milliGRAM(s) IV Push daily    MEDICATIONS  (PRN):  acetaminophen   IVPB .. 1000 milliGRAM(s) IV Intermittent once PRN Temp greater or equal to 38C (100.4F), Mild Pain (1 - 3)  HYDROmorphone  Injectable 0.5 milliGRAM(s) IV Push every 4 hours PRN Severe Pain (7 - 10)  ketorolac   Injectable 15 milliGRAM(s) IV Push every 4 hours PRN Moderate Pain (4 - 6)        RADIOLOGY & ADDITIONAL TESTS:

## 2023-12-01 NOTE — PRE-ANESTHESIA EVALUATION ADULT - NSANTHOSAYNRD_GEN_A_CORE
No. JOSUÉ screening performed.  STOP BANG Legend: 0-2 = LOW Risk; 3-4 = INTERMEDIATE Risk; 5-8 = HIGH Risk

## 2023-12-01 NOTE — CONSULT NOTE ADULT - SUBJECTIVE AND OBJECTIVE BOX
HPI:    BOBO DODSON is a 62-year-old M, Mandarin speaking, nonsmoker, with PMHx of myasthenia gravis, s/p right video-assisted thoracoscopic surgery for thymectomy in 2018. As per patient, post-op was complicated with respiratory failure s/p tracheotomy and PEG placement. He is referred by GI, Dr. Robert Bang for an initial evaluation of newly diagnosed gastric adenocarcinoma at [facility]. Pt was referred to advance GI, Dr. Byrd for staging. Based on the EUS, it is an early-stage gastric cancer and surgical gastrectomy is the recommendation. Pt underwent an elective robotic assisted distal gastrectomy with Pato en Y reconstruction on 12/1/23.  POD 0     Pt seen on 9LA denies pain, on NC2L, appears comfortable    PAST MEDICAL & SURGICAL HISTORY:  H/O myasthenia gravis      Gastric adenocarcinoma      Myasthenia gravis status post thymectomy      H/O tracheostomy      PEG (percutaneous endoscopic gastrostomy) status  pt denies        Home Medications:  mycophenolate mofetil 250 mg oral capsule: 4 cap(s) orally once a day (01 Dec 2023 06:47)  omeprazole 40 mg oral delayed release capsule: 1 cap(s) orally once a day (01 Dec 2023 06:47)  sucralfate 1 g oral tablet: 1 tab(s) orally 2 times a day (01 Dec 2023 06:47)  Vitamin B 6100mg: daily (01 Dec 2023 06:47)  Vitamin D3: daily (01 Dec 2023 06:47)    Allergies    No Known Allergies    Intolerances      FAMILY HISTORY:  Family history of CVA (Father)      Social History:  Former smoker (V15.82) (Z87.891)  No alcohol use  No illicit drug use    Occupation: retired cook  Patient lives with family (22 Nov 2023 09:51)      REVIEW OF SYSTEMS:  CONSTITUTIONAL: No fever, weight loss  EYES: No eye pain, or discharge  ENMT:  No tinnitus, vertigo  NECK: No pain or stiffness  RESPIRATORY: No cough, No dyspnea  CARDIOVASCULAR: No chest pain, or leg swelling  GASTROINTESTINAL: No abdominal pain. No diarrhea ;No melena or hematochezia.  GENITOURINARY: No dysuria, frequency, or hematuria  NEUROLOGICAL: No numbness, or tremors  SKIN: No itching, burning, rashes, or lesions   ENDOCRINE: No heat or cold intolerance;  MUSCULOSKELETAL: No joint pain or swelling;   PSYCHIATRIC: No mood swings, or difficulty sleeping  HEME/LYMPH: No easy bruising, or bleeding gums  ALLERGY AND IMMUNOLOGIC: No hives or eczema    Diet, NPO (12-01-23 @ 11:06) [Active]      CURRENT MEDICATIONS:   acetaminophen   IVPB .. 1000 milliGRAM(s) IV Intermittent once PRN  ceFAZolin   IVPB 2000 milliGRAM(s) IV Intermittent every 8 hours  heparin   Injectable 5000 Unit(s) SubCutaneous every 8 hours  HYDROmorphone  Injectable 0.5 milliGRAM(s) IV Push every 4 hours PRN  ketorolac   Injectable 15 milliGRAM(s) IV Push every 4 hours PRN  lactated ringers. 1000 milliLiter(s) IV Continuous <Continuous>  lidocaine   4% Patch 1 Patch Transdermal daily  pantoprazole  Injectable 40 milliGRAM(s) IV Push daily      VITAL SIGNS, INS/OUTS (last 24 hours):  Vital Signs Last 24 Hrs  T(C): 37.1 (01 Dec 2023 14:31), Max: 37.1 (01 Dec 2023 14:31)  T(F): 98.7 (01 Dec 2023 14:31), Max: 98.7 (01 Dec 2023 14:31)  HR: 78 (01 Dec 2023 16:12) (68 - 79)  BP: 111/66 (01 Dec 2023 16:12) (104/62 - 117/65)  BP(mean): 83 (01 Dec 2023 16:12) (77 - 86)  RR: 16 (01 Dec 2023 16:12) (12 - 18)  SpO2: 96% (01 Dec 2023 16:12) (96% - 99%)    Parameters below as of 01 Dec 2023 16:12  Patient On (Oxygen Delivery Method): nasal cannula w/ humidification  O2 Flow (L/min): 2    I&O's Summary    01 Dec 2023 07:01  -  01 Dec 2023 17:01  --------------------------------------------------------  IN: 410 mL / OUT: 350 mL / NET: 60 mL        PHYSICAL EXAM:  Gen: Reclining in bed at time of exam, appears stated age  HEENT: NCAT, MMM, clear OP  Neck: supple, trachea at midline  CV: RRR, +S1/S2  Pulm: adequate respiratory effort, no increase in work of breathing  Abd: soft, NTND  Skin: warm and dry,  Ext: WWP, no LE edema  Neuro: AOx3, no gross focal neurological deficits  Psych: affect and behavior appropriate, pleasant at time of interview    BASIC LABS:                        14.6   11.97 )-----------( 204      ( 01 Dec 2023 11:32 )             42.2     12-01    138  |  105  |  15  ----------------------------<  120<H>  4.4   |  25  |  0.89    Ca    8.0<L>      01 Dec 2023 11:32      PT/INR - ( 01 Dec 2023 11:32 )   PT: 11.7 sec;   INR: 1.03          PTT - ( 01 Dec 2023 11:32 )  PTT:39.3 sec  Urinalysis Basic - ( 01 Dec 2023 11:32 )    Color: x / Appearance: x / SG: x / pH: x  Gluc: 120 mg/dL / Ketone: x  / Bili: x / Urobili: x   Blood: x / Protein: x / Nitrite: x   Leuk Esterase: x / RBC: x / WBC x   Sq Epi: x / Non Sq Epi: x / Bacteria: x      CAPILLARY BLOOD GLUCOSE          OTHER LABS:        MICRODATA:      IMAGING:    EKG:    #Diet - Diet, NPO (12-01-23 @ 11:06) [Active]        #DVT PPx - 
GENERAL NEUROLOGY CONSULT NOTE:   HPI:  63-year-old male who is a nonsmoker with PMHx of myasthenia gravis s/p right video-assisted thoracoscopic surgery for thymectomy in 2018. As per patient, post-op was complicated with respiratory failure s/p tracheotomy and PFG placement (now reversed), and managed on cellcept 1000mg QD. He he was admitted on 12/1/23 for newly diagnosed gastric adenocarcinoma at [facility], and underwent partial gastrectomy. His symptoms were epigastric pain, worsening when he is fasting, also reported a sense of fullness, even with small meals. Denied vomiting, nausea, unintentional weight loss or fatigue.    General neurology consulted for hx of myasthenia gravis with concerns for NPO status through Monday. Patient described that his symptoms were significant in 2018 with difficulty breathing and severe eyelid drooping. He is followed outpatient by Neurologist Dr Chuck Bhatti  and/or , this provider was unable to call given after hours. Mandarin  ID [id number].     Review of Systems:  CONSTITUTIONAL:  No weight loss, fever, chills, weakness or fatigue.  HEENT:  Eyes:  No visual loss, blurred vision, double vision or yellow sclerae. Ears, Nose, Throat:  No hearing loss, sneezing, congestion, runny nose or sore throat.  SKIN:  No rash or itching.  CARDIOVASCULAR:  No chest pain, chest pressure or chest discomfort. No palpitations or edema.  RESPIRATORY: Report some difficulty breathing, but comfortable w/ NC  GASTROINTESTINAL:  No anorexia, nausea, vomiting or diarrhea. No abdominal pain or blood.  GENITOURINARY: No Burning on urination.   NEUROLOGICAL:  see HPI  MUSCULOSKELETAL:  No muscle, back pain, joint pain or stiffness.  HEMATOLOGIC:  No anemia, bleeding or bruising.  LYMPHATICS:  No enlarged nodes. No history of splenectomy.  PSYCHIATRIC:  No history of depression or anxiety.  ENDOCRINOLOGIC:  No reports of sweating, cold or heat intolerance. No polyuria or polydipsia.  ALLERGIES:  No history of asthma, hives, eczema or rhinitis.    PAST MEDICAL & SURGICAL HISTORY:  H/O myasthenia gravis  Gastric adenocarcinoma  Myasthenia gravis status post thymectomy  H/O tracheostomy  PEG (percutaneous endoscopic gastrostomy) status - pt denies    FAMILY HISTORY:  Family history of CVA (Father)    SOCIAL HISTORY  Alcohol, illicits, smoking?: Never smoker, no drinking, no illicit drug use     ALLERGIES:  No Known Allergies    MEDICATIONS  (STANDING):  ceFAZolin   IVPB 2000 milliGRAM(s) IV Intermittent every 8 hours  heparin   Injectable 5000 Unit(s) SubCutaneous every 8 hours  lactated ringers. 1000 milliLiter(s) (60 mL/Hr) IV Continuous <Continuous>  lidocaine   4% Patch 1 Patch Transdermal daily  pantoprazole  Injectable 40 milliGRAM(s) IV Push daily    MEDICATIONS  (PRN):  acetaminophen   IVPB .. 1000 milliGRAM(s) IV Intermittent once PRN Temp greater or equal to 38C (100.4F), Mild Pain (1 - 3)  HYDROmorphone  Injectable 0.5 milliGRAM(s) IV Push every 4 hours PRN Severe Pain (7 - 10)  ketorolac   Injectable 15 milliGRAM(s) IV Push every 4 hours PRN Moderate Pain (4 - 6)    VITAL SIGNS:   T(C): 36.7 (12-01-23 @ 17:16), Max: 37.1 (12-01-23 @ 14:31)  HR: 78 (12-01-23 @ 16:12) (68 - 79)  BP: 111/66 (12-01-23 @ 16:12) (104/62 - 117/65)  RR: 16 (12-01-23 @ 16:12) (12 - 18)  SpO2: 96% (12-01-23 @ 16:12) (96% - 99%)  Wt(kg): --    PHYSICAL EXAM:  Constitutional: Mid-aged man appearing younger than stated age in no distress in bed  Psychiatric: calm  Abdomen: Soft, flat and tender.  Extremities: no edema, clubbing or cyanosis  Skin: no rash or neurocutaneous signs     Cognitive:  Alert and oriented to name, place and date. Speech fluent in Mandarin w/o aphasia or dysarthria.   Cranial Nerves:  II: Full to confrontation. III/IV/VI: PERRLA 2mm brisk EOMF No nystagmus  V1V2V3: Symmetric, VII: Face appears symmetric VIII: Normal to screening, IX/X: Palate Elevates Symmetrical  XI: Trapezius Symmetric  XII: Tongue midline  Motor:  Power: 5/5 throughout, tone: normal x 4 limbs  Sensation:  Intact to light touch.  Coordination/Gait:  Finger-nose-finger intact, gait deferred  Reflexes:  DTR: 1+ symmetric all 4 limbs, no clonus  Plantar responses: Down bilaterally    LABS:   CBC Full  -  ( 01 Dec 2023 11:32 )  WBC Count : 11.97 K/uL  RBC Count : 4.57 M/uL  Hemoglobin : 14.6 g/dL  Hematocrit : 42.2 %  Platelet Count - Automated : 204 K/uL  Mean Cell Volume : 92.3 fl  Mean Cell Hemoglobin : 31.9 pg  Mean Cell Hemoglobin Concentration : 34.6 gm/dL  Auto Neutrophil # : 11.27 K/uL  Auto Lymphocyte # : 0.57 K/uL  Auto Monocyte # : 0.06 K/uL  Auto Eosinophil # : 0.00 K/uL  Auto Basophil # : 0.02 K/uL  Auto Neutrophil % : 94.1 %  Auto Lymphocyte % : 4.8 %  Auto Monocyte % : 0.5 %  Auto Eosinophil % : 0.0 %  Auto Basophil % : 0.2 %    12-01    138  |  105  |  15  ----------------------------<  120<H>  4.4   |  25  |  0.89    Ca    8.0<L>      01 Dec 2023 11:32    PT/INR - ( 01 Dec 2023 11:32 )   PT: 11.7 sec;   INR: 1.03     PTT - ( 01 Dec 2023 11:32 )  PTT:39.3 sec

## 2023-12-02 LAB
ANION GAP SERPL CALC-SCNC: 8 MMOL/L — SIGNIFICANT CHANGE UP (ref 5–17)
ANION GAP SERPL CALC-SCNC: 8 MMOL/L — SIGNIFICANT CHANGE UP (ref 5–17)
BASOPHILS # BLD AUTO: 0.02 K/UL — SIGNIFICANT CHANGE UP (ref 0–0.2)
BASOPHILS # BLD AUTO: 0.02 K/UL — SIGNIFICANT CHANGE UP (ref 0–0.2)
BASOPHILS NFR BLD AUTO: 0.2 % — SIGNIFICANT CHANGE UP (ref 0–2)
BASOPHILS NFR BLD AUTO: 0.2 % — SIGNIFICANT CHANGE UP (ref 0–2)
BUN SERPL-MCNC: 20 MG/DL — SIGNIFICANT CHANGE UP (ref 7–23)
BUN SERPL-MCNC: 20 MG/DL — SIGNIFICANT CHANGE UP (ref 7–23)
CALCIUM SERPL-MCNC: 7.9 MG/DL — LOW (ref 8.4–10.5)
CALCIUM SERPL-MCNC: 7.9 MG/DL — LOW (ref 8.4–10.5)
CHLORIDE SERPL-SCNC: 105 MMOL/L — SIGNIFICANT CHANGE UP (ref 96–108)
CHLORIDE SERPL-SCNC: 105 MMOL/L — SIGNIFICANT CHANGE UP (ref 96–108)
CO2 SERPL-SCNC: 25 MMOL/L — SIGNIFICANT CHANGE UP (ref 22–31)
CO2 SERPL-SCNC: 25 MMOL/L — SIGNIFICANT CHANGE UP (ref 22–31)
CREAT SERPL-MCNC: 0.94 MG/DL — SIGNIFICANT CHANGE UP (ref 0.5–1.3)
CREAT SERPL-MCNC: 0.94 MG/DL — SIGNIFICANT CHANGE UP (ref 0.5–1.3)
EGFR: 91 ML/MIN/1.73M2 — SIGNIFICANT CHANGE UP
EGFR: 91 ML/MIN/1.73M2 — SIGNIFICANT CHANGE UP
EOSINOPHIL # BLD AUTO: 0 K/UL — SIGNIFICANT CHANGE UP (ref 0–0.5)
EOSINOPHIL # BLD AUTO: 0 K/UL — SIGNIFICANT CHANGE UP (ref 0–0.5)
EOSINOPHIL NFR BLD AUTO: 0 % — SIGNIFICANT CHANGE UP (ref 0–6)
EOSINOPHIL NFR BLD AUTO: 0 % — SIGNIFICANT CHANGE UP (ref 0–6)
GLUCOSE SERPL-MCNC: 109 MG/DL — HIGH (ref 70–99)
GLUCOSE SERPL-MCNC: 109 MG/DL — HIGH (ref 70–99)
HCT VFR BLD CALC: 40.8 % — SIGNIFICANT CHANGE UP (ref 39–50)
HCT VFR BLD CALC: 40.8 % — SIGNIFICANT CHANGE UP (ref 39–50)
HGB BLD-MCNC: 14.1 G/DL — SIGNIFICANT CHANGE UP (ref 13–17)
HGB BLD-MCNC: 14.1 G/DL — SIGNIFICANT CHANGE UP (ref 13–17)
IMM GRANULOCYTES NFR BLD AUTO: 0.7 % — SIGNIFICANT CHANGE UP (ref 0–0.9)
IMM GRANULOCYTES NFR BLD AUTO: 0.7 % — SIGNIFICANT CHANGE UP (ref 0–0.9)
LYMPHOCYTES # BLD AUTO: 1.02 K/UL — SIGNIFICANT CHANGE UP (ref 1–3.3)
LYMPHOCYTES # BLD AUTO: 1.02 K/UL — SIGNIFICANT CHANGE UP (ref 1–3.3)
LYMPHOCYTES # BLD AUTO: 7.7 % — LOW (ref 13–44)
LYMPHOCYTES # BLD AUTO: 7.7 % — LOW (ref 13–44)
MCHC RBC-ENTMCNC: 31.8 PG — SIGNIFICANT CHANGE UP (ref 27–34)
MCHC RBC-ENTMCNC: 31.8 PG — SIGNIFICANT CHANGE UP (ref 27–34)
MCHC RBC-ENTMCNC: 34.6 GM/DL — SIGNIFICANT CHANGE UP (ref 32–36)
MCHC RBC-ENTMCNC: 34.6 GM/DL — SIGNIFICANT CHANGE UP (ref 32–36)
MCV RBC AUTO: 92.1 FL — SIGNIFICANT CHANGE UP (ref 80–100)
MCV RBC AUTO: 92.1 FL — SIGNIFICANT CHANGE UP (ref 80–100)
MONOCYTES # BLD AUTO: 1.12 K/UL — HIGH (ref 0–0.9)
MONOCYTES # BLD AUTO: 1.12 K/UL — HIGH (ref 0–0.9)
MONOCYTES NFR BLD AUTO: 8.4 % — SIGNIFICANT CHANGE UP (ref 2–14)
MONOCYTES NFR BLD AUTO: 8.4 % — SIGNIFICANT CHANGE UP (ref 2–14)
NEUTROPHILS # BLD AUTO: 11.03 K/UL — HIGH (ref 1.8–7.4)
NEUTROPHILS # BLD AUTO: 11.03 K/UL — HIGH (ref 1.8–7.4)
NEUTROPHILS NFR BLD AUTO: 83 % — HIGH (ref 43–77)
NEUTROPHILS NFR BLD AUTO: 83 % — HIGH (ref 43–77)
NRBC # BLD: 0 /100 WBCS — SIGNIFICANT CHANGE UP (ref 0–0)
NRBC # BLD: 0 /100 WBCS — SIGNIFICANT CHANGE UP (ref 0–0)
PLATELET # BLD AUTO: 205 K/UL — SIGNIFICANT CHANGE UP (ref 150–400)
PLATELET # BLD AUTO: 205 K/UL — SIGNIFICANT CHANGE UP (ref 150–400)
POTASSIUM SERPL-MCNC: 4.3 MMOL/L — SIGNIFICANT CHANGE UP (ref 3.5–5.3)
POTASSIUM SERPL-MCNC: 4.3 MMOL/L — SIGNIFICANT CHANGE UP (ref 3.5–5.3)
POTASSIUM SERPL-SCNC: 4.3 MMOL/L — SIGNIFICANT CHANGE UP (ref 3.5–5.3)
POTASSIUM SERPL-SCNC: 4.3 MMOL/L — SIGNIFICANT CHANGE UP (ref 3.5–5.3)
RBC # BLD: 4.43 M/UL — SIGNIFICANT CHANGE UP (ref 4.2–5.8)
RBC # BLD: 4.43 M/UL — SIGNIFICANT CHANGE UP (ref 4.2–5.8)
RBC # FLD: 11.9 % — SIGNIFICANT CHANGE UP (ref 10.3–14.5)
RBC # FLD: 11.9 % — SIGNIFICANT CHANGE UP (ref 10.3–14.5)
SODIUM SERPL-SCNC: 138 MMOL/L — SIGNIFICANT CHANGE UP (ref 135–145)
SODIUM SERPL-SCNC: 138 MMOL/L — SIGNIFICANT CHANGE UP (ref 135–145)
WBC # BLD: 13.28 K/UL — HIGH (ref 3.8–10.5)
WBC # BLD: 13.28 K/UL — HIGH (ref 3.8–10.5)
WBC # FLD AUTO: 13.28 K/UL — HIGH (ref 3.8–10.5)
WBC # FLD AUTO: 13.28 K/UL — HIGH (ref 3.8–10.5)

## 2023-12-02 PROCEDURE — 99232 SBSQ HOSP IP/OBS MODERATE 35: CPT

## 2023-12-02 PROCEDURE — 99222 1ST HOSP IP/OBS MODERATE 55: CPT

## 2023-12-02 PROCEDURE — 99231 SBSQ HOSP IP/OBS SF/LOW 25: CPT | Mod: 24

## 2023-12-02 RX ADMIN — LIDOCAINE 1 PATCH: 4 CREAM TOPICAL at 13:12

## 2023-12-02 RX ADMIN — HEPARIN SODIUM 5000 UNIT(S): 5000 INJECTION INTRAVENOUS; SUBCUTANEOUS at 07:05

## 2023-12-02 RX ADMIN — HEPARIN SODIUM 5000 UNIT(S): 5000 INJECTION INTRAVENOUS; SUBCUTANEOUS at 17:06

## 2023-12-02 RX ADMIN — LIDOCAINE 1 PATCH: 4 CREAM TOPICAL at 18:19

## 2023-12-02 RX ADMIN — Medication 100 MILLIGRAM(S): at 07:06

## 2023-12-02 RX ADMIN — Medication 400 MILLIGRAM(S): at 23:12

## 2023-12-02 RX ADMIN — HEPARIN SODIUM 5000 UNIT(S): 5000 INJECTION INTRAVENOUS; SUBCUTANEOUS at 23:12

## 2023-12-02 RX ADMIN — PANTOPRAZOLE SODIUM 40 MILLIGRAM(S): 20 TABLET, DELAYED RELEASE ORAL at 13:08

## 2023-12-02 RX ADMIN — Medication 1000 MILLIGRAM(S): at 23:27

## 2023-12-02 RX ADMIN — Medication 100 MILLIGRAM(S): at 00:47

## 2023-12-02 RX ADMIN — LIDOCAINE 1 PATCH: 4 CREAM TOPICAL at 00:00

## 2023-12-02 RX ADMIN — MYCOPHENOLATE MOFETIL 83.34 MILLIGRAM(S): 250 CAPSULE ORAL at 13:09

## 2023-12-02 NOTE — PROGRESS NOTE ADULT - SUBJECTIVE AND OBJECTIVE BOX
Patient discussed on morning rounds with Dr. Matt      Operation / Date: EGD, laparoscopic robotic assisted gastrectomy and Pato en Y reconstruction on 12/1/23    SUBJECTIVE ASSESSMENT: Patient seen and examined at bedside. Mandarin  ID#[id number] utilized. Patient states that he feels fine today, just tired. Ambulating and using IS. Voided, but no BM yet. Denies chills, chest pain, SOB, palpitations, N/V.    Vital Signs Last 24 Hrs  T(C): 37 (02 Dec 2023 13:46), Max: 37.2 (02 Dec 2023 01:06)  T(F): 98.6 (02 Dec 2023 13:46), Max: 99 (02 Dec 2023 01:06)  HR: 66 (02 Dec 2023 08:00) (66 - 81)  BP: 127/72 (02 Dec 2023 08:00) (111/66 - 145/70)  BP(mean): 94 (02 Dec 2023 08:00) (82 - 98)  RR: 17 (02 Dec 2023 08:00) (16 - 17)  SpO2: 96% (02 Dec 2023 08:00) (96% - 97%)    Parameters below as of 02 Dec 2023 08:00  Patient On (Oxygen Delivery Method): nasal cannula w/ humidification  O2 Flow (L/min): 2    I&O's Detail    01 Dec 2023 07:01  -  02 Dec 2023 07:00  --------------------------------------------------------  IN:    IV PiggyBack: 50 mL    Lactated Ringers: 540 mL  Total IN: 590 mL    OUT:    Indwelling Catheter - Urethral (mL): 1075 mL    Oral Fluid: 0 mL  Total OUT: 1075 mL    Total NET: -485 mL    CHEST TUBE:  None  JENNA DRAIN:   None  EPICARDIAL WIRES:  None  TIE DOWNS:  None  STUART:  None    PHYSICAL EXAM:  GENERAL: NAD, lying in bed comfortably  HEAD:  Atraumatic, Normocephalic  EYES: EOMI, PERRLA, conjunctiva and sclera clear  ENT: Moist mucous membranes  NECK: Supple, No JVD  CHEST/LUNG: CTAB  HEART: RRR  ABDOMEN: Soft, Nondistended, appropriately ttp; Incision sites well-approximated with Dermabond.   EXTREMITIES:  2+ Peripheral Pulses, brisk capillary refill. No clubbing, cyanosis, or edema  NERVOUS SYSTEM:  Alert & Oriented X3, speech clear. No deficits     LABS:                        14.1   13.28 )-----------( 205      ( 02 Dec 2023 06:24 )             40.8     PT/INR - ( 01 Dec 2023 11:32 )   PT: 11.7 sec;   INR: 1.03       PTT - ( 01 Dec 2023 11:32 )  PTT:39.3 sec    12-02    138  |  105  |  20  ----------------------------<  109<H>  4.3   |  25  |  0.94    Ca    7.9<L>      02 Dec 2023 06:24    Urinalysis Basic - ( 02 Dec 2023 06:24 )    Color: x / Appearance: x / SG: x / pH: x  Gluc: 109 mg/dL / Ketone: x  / Bili: x / Urobili: x   Blood: x / Protein: x / Nitrite: x   Leuk Esterase: x / RBC: x / WBC x   Sq Epi: x / Non Sq Epi: x / Bacteria: x    MEDICATIONS  (STANDING):  heparin   Injectable 5000 Unit(s) SubCutaneous every 8 hours  lactated ringers. 1000 milliLiter(s) (60 mL/Hr) IV Continuous <Continuous>  lidocaine   4% Patch 1 Patch Transdermal daily  mycophenolate mofetil IVPB 1000 milliGRAM(s) IV Intermittent daily  pantoprazole  Injectable 40 milliGRAM(s) IV Push daily    MEDICATIONS  (PRN):  acetaminophen   IVPB .. 1000 milliGRAM(s) IV Intermittent once PRN Temp greater or equal to 38C (100.4F), Mild Pain (1 - 3)  HYDROmorphone  Injectable 0.5 milliGRAM(s) IV Push every 4 hours PRN Severe Pain (7 - 10)  ketorolac   Injectable 15 milliGRAM(s) IV Push every 4 hours PRN Moderate Pain (4 - 6)    RADIOLOGY & ADDITIONAL TESTS:  < from: Xray Chest 1 View- PORTABLE-Urgent (12.01.23 @ 11:20) >  FINDINGS/  IMPRESSION: There is cardiomegaly with a probable small left-sided   pleural effusion. Increased retrocardiac opacity and underlying left   lower lobe pneumonia and/or atelectasis cannot be excluded. There is no   pneumothorax. No acute soft tissue or osseous abnormalities. Follow-up   imaging to confirm resolution.

## 2023-12-02 NOTE — PROGRESS NOTE ADULT - ASSESSMENT
BOBO DODSON is a 62-year-old M, Mandarin speaking, nonsmoker, with PMHx of myasthenia gravis, s/p right video-assisted thoracoscopic surgery for thymectomy in 2018. As per patient, post-op was complicated with respiratory failure s/p tracheotomy and PEG placement. He is referred by GI, Dr. Elke Bang for an initial evaluation of newly diagnosed gastric adenocarcinoma at [facility]. Pt was referred to advance GI, Dr. Byrd for staging. Based on the EUS, it is an early-stage gastric cancer and surgical gastrectomy is the recommendation. Pt underwent an elective robotic assisted distal gastrectomy with Pato en Y reconstruction on 12/1/23.  POD 1    - 9LA for hemodynamic monitoring   - Neuro consult appreciated rec: MG  - Continue Cellcept 1000mg IV QD while NPO (home med: mycophenolate mofetil 250 mg oral capsule: 4 cap(s) orally once a day)   - Please avoid magnesium sulfate in MG patients  - Please avoid medications that could potentially exacerbate MG (such as some antibiotics, statins, and propanolol to name a few)  - O2 supplement NC1L/IS as tolerated   - NPO/plan for swallowing study on Monday 12/4   - IVF with lactated ringers. 1000 milliLiter(s) IV Continuous <Continuous>  - postop iv abx: ceFAZolin   IVPB 2000 milliGRAM(s) IV Intermittent every 8 hours x3 only per protocol  - pain management   lidocaine   4% Patch 1 Patch Transdermal daily  acetaminophen   IVPB .. 1000 milliGRAM(s) IV Intermittent once PRN  ketorolac   Injectable 15 milliGRAM(s) IV Push every 4 hours PRN  HYDROmorphone  Injectable 0.5 milliGRAM(s) IV Push every 4 hours PRN  - GI ppx: pantoprazole  Injectable 40 milliGRAM(s) IV Push daily  - DVT ppx: heparin   Injectable 5000 Unit(s) SubCutaneous every 8 hours  - held oral home meds while NPO (mycophenolate mofetil 250 mg oral capsule: 4 cap(s) orally once a day (01 Dec 2023 06:40)     REF/GASTRO: DR. ELKE BANG   P:[phone]    PCP:  [facility]  [phone]    Disposition: medically active     Thank you for the consult, will continue follow pt with you.

## 2023-12-02 NOTE — PROGRESS NOTE ADULT - ASSESSMENT
63-year-old male who is a nonsmoker with PMHx of myasthenia gravis s/p right video-assisted thoracoscopic surgery for thymectomy in 2018 c/b respiratory failure that required tracheostomy and PEG (now reversed), and maintained on cellcept. Admitted for new adenocarcinoma and underwent partial gastrectomy on 12/1/23.     Neurology consulted for hx of myasthenia gravis and management as well as concerns for NPO status through Monday. Given that patient has been well controlled on cell-cept with normal exam, would continue to exercise precautionary measures with medications that could potentially exacerbate MG.     Recommendations:  - Continue Cellcept 1000mg IV QD  - Please avoid magnesium sulfate in MG patients  - Please avoid medications that could potentially exacerbate MG (such as some antibiotics, statins, and propanolol to name a few)      Neurology will sign off. Please call us with any change in patient's neurological status   63-year-old male who is a nonsmoker with PMHx of myasthenia gravis s/p right video-assisted thoracoscopic surgery for thymectomy in 2018 c/b respiratory failure that required tracheostomy and PEG (now reversed), and maintained on cellcept. Admitted for new adenocarcinoma and underwent partial gastrectomy on 12/1/23.     Neurology consulted for hx of myasthenia gravis and management as well as concerns for NPO status through Monday. Given that patient has been well controlled on cell-cept with normal exam, would continue to exercise precautionary measures with medications that could potentially exacerbate MG.     Recommendations:  - Continue Cellcept 1000mg IV QD  - Please avoid magnesium sulfate in MG patients  - Please avoid medications that could potentially exacerbate MG (such as some antibiotics, statins, and propanolol to name a few)      Neurology will continue to follow. Please call us with any change in patient's neurological status

## 2023-12-02 NOTE — PROGRESS NOTE ADULT - ASSESSMENT
61 YO Male w/ PMHx of myasthenia gravis, s/p right video-assisted thoracoscopic surgery for thymectomy (2018), per pt, post-op was c/b respiratory failure s/p tracheotomy and PEG placement. He is referred by Dr. Robert Bang for an initial evaluation of newly diagnosed gastric adenocarcinoma at [facility]. Pt was referred to Dr. Byrd for staging. Based on the EUS, it was determined to be early-stage gastric cancer and patient referred to Dr. Fitzgerald for surgical intervention. On 12/01/2 he underwent, EGD, laparoscopic robotic assisted gastrectomy and Pato en Y reconstruction. Patient recovered in PACU then 9LA. POD1 patient doing well, paul removed and passed TOV. Kept NPO until esophagram.     Plan:    Neurovascular:   -Hx of myasthenia gravis  -Cont Cellcept  -Pain well controlled with current regimen. PRN's: Tylenol, Toradol, Dilaudid    Cardiovascular:   -Hemodynamically stable.   -Monitor: BP, HR, tele    Respiratory:   -Oxygenating well on room air  -Encourage continued use of IS 10x/hr and frequent ambulation  -CXR: stable    GI:  -Gastric cancer s/p  EGD, laparoscopic robotic assisted gastrectomy and Pato en Y reconstruction on 12/1/23  -Keep NPO  -Plan for esophagram Monday before advancing diet  -GI PPX: IV protonix  -NPO  -Bowel Regimen: none    Renal / :  -Continue to monitor renal function: BUN/Cr: 20/0.94  -Monitor I/O's daily     Endocrine:    -No hx of DM or thyroid dx  -A1c: not obtained  -TSH: not obtained    Hematologic:  -CBC: H/H- 14/40  -Coagulation Panel.    ID:  -Temperature: Afebrile   -CBC: WBC- 13  -Continue to observe for SIRS/Sepsis Syndrome.    Prophylaxis:  -DVT prophylaxis with 5000 SubQ Heparin q8h.  -Continue with SCD's b/l while patient is at rest     Disposition:  -Esophagram on Monday   61 YO Male w/ PMHx of myasthenia gravis, s/p right video-assisted thoracoscopic surgery for thymectomy (2018), per pt, post-op was c/b respiratory failure s/p tracheotomy and PEG placement. He is referred by Dr. Robert Bang for an initial evaluation of newly diagnosed gastric adenocarcinoma at [facility]. Pt was referred to Dr. Byrd for staging. Based on the EUS, it was determined to be early-stage gastric cancer and patient referred to Dr. Fitzgerald for surgical intervention. On 12/01/2 he underwent, EGD, laparoscopic robotic assisted gastrectomy and Pato en Y reconstruction. Patient recovered in PACU then 9LA. Seen by neuro team for management of myasthenia gravis, recs appreciated. POD1 patient doing well, paul removed and passed TOV. Kept NPO until esophagram.     Plan:    Neurovascular:   -Hx of myasthenia gravis  -Cont Cellcept  -Neuro following  -Pain well controlled with current regimen. PRN's: Tylenol, Toradol, Dilaudid    Cardiovascular:   -Hemodynamically stable.   -Monitor: BP, HR, tele    Respiratory:   -Oxygenating well on room air  -Encourage continued use of IS 10x/hr and frequent ambulation  -CXR: stable    GI:  -Gastric cancer s/p  EGD, laparoscopic robotic assisted gastrectomy and Pato en Y reconstruction on 12/1/23  -Keep NPO  -Plan for esophagram Monday before advancing diet  -GI PPX: IV protonix  -NPO  -Bowel Regimen: none    Renal / :  -Continue to monitor renal function: BUN/Cr: 20/0.94  -Monitor I/O's daily     Endocrine:    -No hx of DM or thyroid dx  -A1c: not obtained  -TSH: not obtained    Hematologic:  -CBC: H/H- 14/40  -Coagulation Panel.    ID:  -Temperature: Afebrile   -CBC: WBC- 13  -Continue to observe for SIRS/Sepsis Syndrome.    Prophylaxis:  -DVT prophylaxis with 5000 SubQ Heparin q8h.  -Continue with SCD's b/l while patient is at rest     Disposition:  -Esophagram on Monday

## 2023-12-02 NOTE — PROGRESS NOTE ADULT - SUBJECTIVE AND OBJECTIVE BOX
Patient is a 63y old  Male who presents with a chief complaint of elective surgery: EGD, laparoscopic robotic assisted gastrectomy and reconstruction with possible J-tube placement (02 Dec 2023 10:06)    INTERVAL EVENTS: NAEON     SUBJECTIVE:  Patient was seen and examined at bedside. Resting comfortably on NC1L, no respiratory distress, denies SOB. Reports minimal epigastric incision pain. Pt aware NPO until swallowing study on Monday 12/4     Review of systems: No fever, chills, dizziness, HA, Changes in vision, CP, dyspnea, nausea or vomiting, dysuria, changes in bowel movements, LE edema. Rest of 12 point Review of systems negative unless otherwise documented elsewhere in note.     Diet, NPO (12-01-23 @ 11:06) [Active]      MEDICATIONS:  MEDICATIONS  (STANDING):  heparin   Injectable 5000 Unit(s) SubCutaneous every 8 hours  lactated ringers. 1000 milliLiter(s) (60 mL/Hr) IV Continuous <Continuous>  lidocaine   4% Patch 1 Patch Transdermal daily  mycophenolate mofetil IVPB 1000 milliGRAM(s) IV Intermittent daily  pantoprazole  Injectable 40 milliGRAM(s) IV Push daily    MEDICATIONS  (PRN):  acetaminophen   IVPB .. 1000 milliGRAM(s) IV Intermittent once PRN Temp greater or equal to 38C (100.4F), Mild Pain (1 - 3)  HYDROmorphone  Injectable 0.5 milliGRAM(s) IV Push every 4 hours PRN Severe Pain (7 - 10)  ketorolac   Injectable 15 milliGRAM(s) IV Push every 4 hours PRN Moderate Pain (4 - 6)      Allergies    No Known Allergies    Intolerances        OBJECTIVE:  Vital Signs Last 24 Hrs  T(C): 37.2 (02 Dec 2023 09:17), Max: 37.2 (02 Dec 2023 01:06)  T(F): 98.9 (02 Dec 2023 09:17), Max: 99 (02 Dec 2023 01:06)  HR: 66 (02 Dec 2023 08:00) (66 - 81)  BP: 127/72 (02 Dec 2023 08:00) (104/62 - 145/70)  BP(mean): 94 (02 Dec 2023 08:00) (77 - 98)  RR: 17 (02 Dec 2023 08:00) (12 - 18)  SpO2: 96% (02 Dec 2023 08:00) (96% - 99%)    Parameters below as of 02 Dec 2023 08:00  Patient On (Oxygen Delivery Method): nasal cannula w/ humidification  O2 Flow (L/min): 2    I&O's Summary    01 Dec 2023 07:01  -  02 Dec 2023 07:00  --------------------------------------------------------  IN: 590 mL / OUT: 1075 mL / NET: -485 mL        PHYSICAL EXAM:  Gen: Reclining in bed at time of exam, appears stated age  HEENT: NCAT, MMM, clear OP  Neck: supple, trachea at midline  CV: RRR, +S1/S2  Pulm: adequate respiratory effort, no increase in work of breathing  Abd: soft, NTND  Skin: warm and dry,   Ext: WWP, no LE edema  Neuro: AOx3, no gross focal neurological deficits  Psych: affect and behavior appropriate, pleasant at time of interview  :     LABS:                        14.1   13.28 )-----------( 205      ( 02 Dec 2023 06:24 )             40.8     12-02    138  |  105  |  20  ----------------------------<  109<H>  4.3   |  25  |  0.94    Ca    7.9<L>      02 Dec 2023 06:24        PT/INR - ( 01 Dec 2023 11:32 )   PT: 11.7 sec;   INR: 1.03          PTT - ( 01 Dec 2023 11:32 )  PTT:39.3 sec  CAPILLARY BLOOD GLUCOSE        Urinalysis Basic - ( 02 Dec 2023 06:24 )    Color: x / Appearance: x / SG: x / pH: x  Gluc: 109 mg/dL / Ketone: x  / Bili: x / Urobili: x   Blood: x / Protein: x / Nitrite: x   Leuk Esterase: x / RBC: x / WBC x   Sq Epi: x / Non Sq Epi: x / Bacteria: x        MICRODATA:      RADIOLOGY/OTHER STUDIES:    PCP  Pharmacy:   Emergency contact:

## 2023-12-02 NOTE — PROGRESS NOTE ADULT - SUBJECTIVE AND OBJECTIVE BOX
Neurology Progress Note    Interval History:  The patient was seen and examined at the bedside. History via  ID [id number]. Feels well no complaints.       PAST MEDICAL & SURGICAL HISTORY:  H/O myasthenia gravis    Gastric adenocarcinoma    Myasthenia gravis status post thymectomy    H/O tracheostomy    PEG (percutaneous endoscopic gastrostomy) status  pt denies            Medications:  acetaminophen   IVPB .. 1000 milliGRAM(s) IV Intermittent once PRN  heparin   Injectable 5000 Unit(s) SubCutaneous every 8 hours  HYDROmorphone  Injectable 0.5 milliGRAM(s) IV Push every 4 hours PRN  ketorolac   Injectable 15 milliGRAM(s) IV Push every 4 hours PRN  lactated ringers. 1000 milliLiter(s) IV Continuous <Continuous>  lidocaine   4% Patch 1 Patch Transdermal daily  mycophenolate mofetil IVPB 1000 milliGRAM(s) IV Intermittent daily  pantoprazole  Injectable 40 milliGRAM(s) IV Push daily      Vital Signs Last 24 Hrs  T(C): 37.2 (02 Dec 2023 09:17), Max: 37.2 (02 Dec 2023 01:06)  T(F): 98.9 (02 Dec 2023 09:17), Max: 99 (02 Dec 2023 01:06)  HR: 66 (02 Dec 2023 08:00) (66 - 81)  BP: 127/72 (02 Dec 2023 08:00) (104/62 - 145/70)  BP(mean): 94 (02 Dec 2023 08:00) (77 - 98)  RR: 17 (02 Dec 2023 08:00) (12 - 18)  SpO2: 96% (02 Dec 2023 08:00) (96% - 99%)    Parameters below as of 02 Dec 2023 08:00  Patient On (Oxygen Delivery Method): nasal cannula w/ humidification  O2 Flow (L/min): 2      Neurological Exam:   Alert and oriented to name, place and date. Speech fluent in Mandarin w/o aphasia or dysarthria.   Cranial Nerves:  II: Full to confrontation. III/IV/VI: PERRLA 2mm brisk EOMF No nystagmus  V1V2V3: Symmetric, VII: Face appears symmetric VIII: Normal to screening, IX/X: Palate Elevates Symmetrical  XI: Trapezius Symmetric  XII: Tongue midline  Motor:  Power: 5/5 throughout, tone: normal x 4 limbs  Sensation:  Intact to light touch.  Coordination/Gait:  Finger-nose-finger intact, gait deferred  Reflexes:  DTR: 1+ symmetric all 4 limbs, no clonus  Plantar responses: Down bilaterally      Labs:  CBC Full  -  ( 02 Dec 2023 06:24 )  WBC Count : 13.28 K/uL  RBC Count : 4.43 M/uL  Hemoglobin : 14.1 g/dL  Hematocrit : 40.8 %  Platelet Count - Automated : 205 K/uL  Mean Cell Volume : 92.1 fl  Mean Cell Hemoglobin : 31.8 pg  Mean Cell Hemoglobin Concentration : 34.6 gm/dL  Auto Neutrophil # : 11.03 K/uL  Auto Lymphocyte # : 1.02 K/uL  Auto Monocyte # : 1.12 K/uL  Auto Eosinophil # : 0.00 K/uL  Auto Basophil # : 0.02 K/uL  Auto Neutrophil % : 83.0 %  Auto Lymphocyte % : 7.7 %  Auto Monocyte % : 8.4 %  Auto Eosinophil % : 0.0 %  Auto Basophil % : 0.2 %    12-02    138  |  105  |  20  ----------------------------<  109<H>  4.3   |  25  |  0.94    Ca    7.9<L>      02 Dec 2023 06:24        PT/INR - ( 01 Dec 2023 11:32 )   PT: 11.7 sec;   INR: 1.03          PTT - ( 01 Dec 2023 11:32 )  PTT:39.3 sec  Urinalysis Basic - ( 02 Dec 2023 06:24 )    Color: x / Appearance: x / SG: x / pH: x  Gluc: 109 mg/dL / Ketone: x  / Bili: x / Urobili: x   Blood: x / Protein: x / Nitrite: x   Leuk Esterase: x / RBC: x / WBC x   Sq Epi: x / Non Sq Epi: x / Bacteria: x        Assessment:  This is a 63y Male w/ h/o     Plan: Neurology Progress Note    Interval History:  The patient was seen and examined at the bedside. History via  ID [id number]. Feels well no complaints.       PAST MEDICAL & SURGICAL HISTORY:  H/O myasthenia gravis    Gastric adenocarcinoma    Myasthenia gravis status post thymectomy    H/O tracheostomy    PEG (percutaneous endoscopic gastrostomy) status  pt denies            Medications:  acetaminophen   IVPB .. 1000 milliGRAM(s) IV Intermittent once PRN  heparin   Injectable 5000 Unit(s) SubCutaneous every 8 hours  HYDROmorphone  Injectable 0.5 milliGRAM(s) IV Push every 4 hours PRN  ketorolac   Injectable 15 milliGRAM(s) IV Push every 4 hours PRN  lactated ringers. 1000 milliLiter(s) IV Continuous <Continuous>  lidocaine   4% Patch 1 Patch Transdermal daily  mycophenolate mofetil IVPB 1000 milliGRAM(s) IV Intermittent daily  pantoprazole  Injectable 40 milliGRAM(s) IV Push daily      Vital Signs Last 24 Hrs  T(C): 37.2 (02 Dec 2023 09:17), Max: 37.2 (02 Dec 2023 01:06)  T(F): 98.9 (02 Dec 2023 09:17), Max: 99 (02 Dec 2023 01:06)  HR: 66 (02 Dec 2023 08:00) (66 - 81)  BP: 127/72 (02 Dec 2023 08:00) (104/62 - 145/70)  BP(mean): 94 (02 Dec 2023 08:00) (77 - 98)  RR: 17 (02 Dec 2023 08:00) (12 - 18)  SpO2: 96% (02 Dec 2023 08:00) (96% - 99%)    Parameters below as of 02 Dec 2023 08:00  Patient On (Oxygen Delivery Method): nasal cannula w/ humidification  O2 Flow (L/min): 2      Neurological Exam:   Alert and oriented to name, place and date. Speech fluent in Mandarin w/o aphasia or dysarthria.   Cranial Nerves:  II: Full to confrontation. III/IV/VI: PERRLA 2mm brisk EOMF No nystagmus  V1V2V3: Symmetric, VII: Face appears symmetric VIII: Normal to screening, IX/X: Palate Elevates Symmetrical  XI: Trapezius Symmetric  XII: Tongue midline  Motor:  Power: 5/5 throughout, tone: normal x 4 limbs  Sensation:  Intact to light touch.  Coordination/Gait:  Finger-nose-finger intact, gait deferred  Reflexes:  DTR: 1+ symmetric all 4 limbs, no clonus  Plantar responses: Down bilaterally      Labs:  CBC Full  -  ( 02 Dec 2023 06:24 )  WBC Count : 13.28 K/uL  RBC Count : 4.43 M/uL  Hemoglobin : 14.1 g/dL  Hematocrit : 40.8 %  Platelet Count - Automated : 205 K/uL  Mean Cell Volume : 92.1 fl  Mean Cell Hemoglobin : 31.8 pg  Mean Cell Hemoglobin Concentration : 34.6 gm/dL  Auto Neutrophil # : 11.03 K/uL  Auto Lymphocyte # : 1.02 K/uL  Auto Monocyte # : 1.12 K/uL  Auto Eosinophil # : 0.00 K/uL  Auto Basophil # : 0.02 K/uL  Auto Neutrophil % : 83.0 %  Auto Lymphocyte % : 7.7 %  Auto Monocyte % : 8.4 %  Auto Eosinophil % : 0.0 %  Auto Basophil % : 0.2 %    12-02    138  |  105  |  20  ----------------------------<  109<H>  4.3   |  25  |  0.94    Ca    7.9<L>      02 Dec 2023 06:24        PT/INR - ( 01 Dec 2023 11:32 )   PT: 11.7 sec;   INR: 1.03          PTT - ( 01 Dec 2023 11:32 )  PTT:39.3 sec  Urinalysis Basic - ( 02 Dec 2023 06:24 )    Color: x / Appearance: x / SG: x / pH: x  Gluc: 109 mg/dL / Ketone: x  / Bili: x / Urobili: x   Blood: x / Protein: x / Nitrite: x   Leuk Esterase: x / RBC: x / WBC x   Sq Epi: x / Non Sq Epi: x / Bacteria: x        Assessment:  This is a 63y Male w/ h/o     Plan: Neurology Progress Note    Interval History:  The patient was seen and examined at the bedside. History via  ID [id number]. Feels well no complaints.       PAST MEDICAL & SURGICAL HISTORY:  H/O myasthenia gravis    Gastric adenocarcinoma    Myasthenia gravis status post thymectomy    H/O tracheostomy    PEG (percutaneous endoscopic gastrostomy) status  pt denies            Medications:  acetaminophen   IVPB .. 1000 milliGRAM(s) IV Intermittent once PRN  heparin   Injectable 5000 Unit(s) SubCutaneous every 8 hours  HYDROmorphone  Injectable 0.5 milliGRAM(s) IV Push every 4 hours PRN  ketorolac   Injectable 15 milliGRAM(s) IV Push every 4 hours PRN  lactated ringers. 1000 milliLiter(s) IV Continuous <Continuous>  lidocaine   4% Patch 1 Patch Transdermal daily  mycophenolate mofetil IVPB 1000 milliGRAM(s) IV Intermittent daily  pantoprazole  Injectable 40 milliGRAM(s) IV Push daily      Vital Signs Last 24 Hrs  T(C): 37.2 (02 Dec 2023 09:17), Max: 37.2 (02 Dec 2023 01:06)  T(F): 98.9 (02 Dec 2023 09:17), Max: 99 (02 Dec 2023 01:06)  HR: 66 (02 Dec 2023 08:00) (66 - 81)  BP: 127/72 (02 Dec 2023 08:00) (104/62 - 145/70)  BP(mean): 94 (02 Dec 2023 08:00) (77 - 98)  RR: 17 (02 Dec 2023 08:00) (12 - 18)  SpO2: 96% (02 Dec 2023 08:00) (96% - 99%)    Parameters below as of 02 Dec 2023 08:00  Patient On (Oxygen Delivery Method): nasal cannula w/ humidification  O2 Flow (L/min): 2      Neurological Exam:   Alert and oriented to name, place and date. Speech fluent in Mandarin w/o aphasia or dysarthria.   Cranial Nerves:  II: Full to confrontation. III/IV/VI: PERRLA 2mm brisk EOMF No nystagmus  V1V2V3: Symmetric, VII: Face appears symmetric VIII: Normal to screening, IX/X: Palate Elevates Symmetrical  XI: Trapezius Symmetric  XII: Tongue midline  Motor:  Power: 5/5 throughout, tone: normal x 4 limbs  Sensation:  Intact to light touch.  Coordination/Gait:  Finger-nose-finger intact, gait deferred  Reflexes:  DTR: 1+ symmetric all 4 limbs, no clonus  Plantar responses: Down bilaterally      Labs:  CBC Full  -  ( 02 Dec 2023 06:24 )  WBC Count : 13.28 K/uL  RBC Count : 4.43 M/uL  Hemoglobin : 14.1 g/dL  Hematocrit : 40.8 %  Platelet Count - Automated : 205 K/uL  Mean Cell Volume : 92.1 fl  Mean Cell Hemoglobin : 31.8 pg  Mean Cell Hemoglobin Concentration : 34.6 gm/dL  Auto Neutrophil # : 11.03 K/uL  Auto Lymphocyte # : 1.02 K/uL  Auto Monocyte # : 1.12 K/uL  Auto Eosinophil # : 0.00 K/uL  Auto Basophil # : 0.02 K/uL  Auto Neutrophil % : 83.0 %  Auto Lymphocyte % : 7.7 %  Auto Monocyte % : 8.4 %  Auto Eosinophil % : 0.0 %  Auto Basophil % : 0.2 %    12-02    138  |  105  |  20  ----------------------------<  109<H>  4.3   |  25  |  0.94    Ca    7.9<L>      02 Dec 2023 06:24        PT/INR - ( 01 Dec 2023 11:32 )   PT: 11.7 sec;   INR: 1.03          PTT - ( 01 Dec 2023 11:32 )  PTT:39.3 sec  Urinalysis Basic - ( 02 Dec 2023 06:24 )    Color: x / Appearance: x / SG: x / pH: x  Gluc: 109 mg/dL / Ketone: x  / Bili: x / Urobili: x   Blood: x / Protein: x / Nitrite: x   Leuk Esterase: x / RBC: x / WBC x   Sq Epi: x / Non Sq Epi: x / Bacteria: x        Assessment:  This is a 63y Male w/ h/o     Plan:

## 2023-12-02 NOTE — PROGRESS NOTE ADULT - ATTENDING COMMENTS
I was physically present for the key portions of the evaluation and managemnent (E/M) service provided.  I agree with the above history, physical, and plan which I have reviewed and edited where appropriate, with the exceptions as per my note.    pt seen and examined - normal neuro exam, good proximal and neck strength, and CNs intact.    AP: MG, cont cellcept. I doubt there will be any neurological dysfunction given his current exam, but will continue to monitor with you. management as per primary team. please double check for any new medications that they are not medications that are known to exacerbate MG (there is an exhaustive list).

## 2023-12-03 LAB
ANION GAP SERPL CALC-SCNC: 8 MMOL/L — SIGNIFICANT CHANGE UP (ref 5–17)
ANION GAP SERPL CALC-SCNC: 8 MMOL/L — SIGNIFICANT CHANGE UP (ref 5–17)
BUN SERPL-MCNC: 15 MG/DL — SIGNIFICANT CHANGE UP (ref 7–23)
BUN SERPL-MCNC: 15 MG/DL — SIGNIFICANT CHANGE UP (ref 7–23)
CALCIUM SERPL-MCNC: 8.2 MG/DL — LOW (ref 8.4–10.5)
CALCIUM SERPL-MCNC: 8.2 MG/DL — LOW (ref 8.4–10.5)
CHLORIDE SERPL-SCNC: 106 MMOL/L — SIGNIFICANT CHANGE UP (ref 96–108)
CHLORIDE SERPL-SCNC: 106 MMOL/L — SIGNIFICANT CHANGE UP (ref 96–108)
CO2 SERPL-SCNC: 27 MMOL/L — SIGNIFICANT CHANGE UP (ref 22–31)
CO2 SERPL-SCNC: 27 MMOL/L — SIGNIFICANT CHANGE UP (ref 22–31)
CREAT SERPL-MCNC: 0.77 MG/DL — SIGNIFICANT CHANGE UP (ref 0.5–1.3)
CREAT SERPL-MCNC: 0.77 MG/DL — SIGNIFICANT CHANGE UP (ref 0.5–1.3)
EGFR: 100 ML/MIN/1.73M2 — SIGNIFICANT CHANGE UP
EGFR: 100 ML/MIN/1.73M2 — SIGNIFICANT CHANGE UP
GLUCOSE SERPL-MCNC: 89 MG/DL — SIGNIFICANT CHANGE UP (ref 70–99)
GLUCOSE SERPL-MCNC: 89 MG/DL — SIGNIFICANT CHANGE UP (ref 70–99)
HCT VFR BLD CALC: 41.3 % — SIGNIFICANT CHANGE UP (ref 39–50)
HCT VFR BLD CALC: 41.3 % — SIGNIFICANT CHANGE UP (ref 39–50)
HGB BLD-MCNC: 13.9 G/DL — SIGNIFICANT CHANGE UP (ref 13–17)
HGB BLD-MCNC: 13.9 G/DL — SIGNIFICANT CHANGE UP (ref 13–17)
MAGNESIUM SERPL-MCNC: 2.1 MG/DL — SIGNIFICANT CHANGE UP (ref 1.6–2.6)
MAGNESIUM SERPL-MCNC: 2.1 MG/DL — SIGNIFICANT CHANGE UP (ref 1.6–2.6)
MCHC RBC-ENTMCNC: 31.4 PG — SIGNIFICANT CHANGE UP (ref 27–34)
MCHC RBC-ENTMCNC: 31.4 PG — SIGNIFICANT CHANGE UP (ref 27–34)
MCHC RBC-ENTMCNC: 33.7 GM/DL — SIGNIFICANT CHANGE UP (ref 32–36)
MCHC RBC-ENTMCNC: 33.7 GM/DL — SIGNIFICANT CHANGE UP (ref 32–36)
MCV RBC AUTO: 93.2 FL — SIGNIFICANT CHANGE UP (ref 80–100)
MCV RBC AUTO: 93.2 FL — SIGNIFICANT CHANGE UP (ref 80–100)
NRBC # BLD: 0 /100 WBCS — SIGNIFICANT CHANGE UP (ref 0–0)
NRBC # BLD: 0 /100 WBCS — SIGNIFICANT CHANGE UP (ref 0–0)
PLATELET # BLD AUTO: 185 K/UL — SIGNIFICANT CHANGE UP (ref 150–400)
PLATELET # BLD AUTO: 185 K/UL — SIGNIFICANT CHANGE UP (ref 150–400)
POTASSIUM SERPL-MCNC: 3.8 MMOL/L — SIGNIFICANT CHANGE UP (ref 3.5–5.3)
POTASSIUM SERPL-MCNC: 3.8 MMOL/L — SIGNIFICANT CHANGE UP (ref 3.5–5.3)
POTASSIUM SERPL-SCNC: 3.8 MMOL/L — SIGNIFICANT CHANGE UP (ref 3.5–5.3)
POTASSIUM SERPL-SCNC: 3.8 MMOL/L — SIGNIFICANT CHANGE UP (ref 3.5–5.3)
RBC # BLD: 4.43 M/UL — SIGNIFICANT CHANGE UP (ref 4.2–5.8)
RBC # BLD: 4.43 M/UL — SIGNIFICANT CHANGE UP (ref 4.2–5.8)
RBC # FLD: 12.2 % — SIGNIFICANT CHANGE UP (ref 10.3–14.5)
RBC # FLD: 12.2 % — SIGNIFICANT CHANGE UP (ref 10.3–14.5)
SODIUM SERPL-SCNC: 141 MMOL/L — SIGNIFICANT CHANGE UP (ref 135–145)
SODIUM SERPL-SCNC: 141 MMOL/L — SIGNIFICANT CHANGE UP (ref 135–145)
WBC # BLD: 10.87 K/UL — HIGH (ref 3.8–10.5)
WBC # BLD: 10.87 K/UL — HIGH (ref 3.8–10.5)
WBC # FLD AUTO: 10.87 K/UL — HIGH (ref 3.8–10.5)
WBC # FLD AUTO: 10.87 K/UL — HIGH (ref 3.8–10.5)

## 2023-12-03 PROCEDURE — 71045 X-RAY EXAM CHEST 1 VIEW: CPT | Mod: 26

## 2023-12-03 PROCEDURE — 99231 SBSQ HOSP IP/OBS SF/LOW 25: CPT | Mod: 24

## 2023-12-03 PROCEDURE — 99232 SBSQ HOSP IP/OBS MODERATE 35: CPT

## 2023-12-03 RX ORDER — POTASSIUM CHLORIDE 20 MEQ
10 PACKET (EA) ORAL
Refills: 0 | Status: COMPLETED | OUTPATIENT
Start: 2023-12-03 | End: 2023-12-03

## 2023-12-03 RX ORDER — DEXTROSE MONOHYDRATE, SODIUM CHLORIDE, AND POTASSIUM CHLORIDE 50; .745; 4.5 G/1000ML; G/1000ML; G/1000ML
1000 INJECTION, SOLUTION INTRAVENOUS
Refills: 0 | Status: DISCONTINUED | OUTPATIENT
Start: 2023-12-03 | End: 2023-12-04

## 2023-12-03 RX ORDER — ACETAMINOPHEN 500 MG
1000 TABLET ORAL ONCE
Refills: 0 | Status: DISCONTINUED | OUTPATIENT
Start: 2023-12-03 | End: 2023-12-04

## 2023-12-03 RX ADMIN — HEPARIN SODIUM 5000 UNIT(S): 5000 INJECTION INTRAVENOUS; SUBCUTANEOUS at 07:40

## 2023-12-03 RX ADMIN — Medication 15 MILLIGRAM(S): at 14:10

## 2023-12-03 RX ADMIN — MYCOPHENOLATE MOFETIL 83.34 MILLIGRAM(S): 250 CAPSULE ORAL at 11:29

## 2023-12-03 RX ADMIN — Medication 100 MILLIEQUIVALENT(S): at 09:03

## 2023-12-03 RX ADMIN — LIDOCAINE 1 PATCH: 4 CREAM TOPICAL at 01:45

## 2023-12-03 RX ADMIN — Medication 15 MILLIGRAM(S): at 13:41

## 2023-12-03 RX ADMIN — HEPARIN SODIUM 5000 UNIT(S): 5000 INJECTION INTRAVENOUS; SUBCUTANEOUS at 17:02

## 2023-12-03 RX ADMIN — Medication 100 MILLIEQUIVALENT(S): at 07:40

## 2023-12-03 RX ADMIN — PANTOPRAZOLE SODIUM 40 MILLIGRAM(S): 20 TABLET, DELAYED RELEASE ORAL at 11:29

## 2023-12-03 RX ADMIN — HEPARIN SODIUM 5000 UNIT(S): 5000 INJECTION INTRAVENOUS; SUBCUTANEOUS at 23:02

## 2023-12-03 RX ADMIN — DEXTROSE MONOHYDRATE, SODIUM CHLORIDE, AND POTASSIUM CHLORIDE 60 MILLILITER(S): 50; .745; 4.5 INJECTION, SOLUTION INTRAVENOUS at 16:05

## 2023-12-03 NOTE — PROGRESS NOTE ADULT - SUBJECTIVE AND OBJECTIVE BOX
Patient is a 63y old  Male who presents with a chief complaint of elective surgery: EGD, laparoscopic robotic assisted gastrectomy and reconstruction with possible J-tube placement (03 Dec 2023 07:46)    INTERVAL EVENTS: NAEON    SUBJECTIVE:  Patient was seen and examined at bedside. OOBTC, denies SOB, pain,etc. in good spirits.     Review of systems: No fever, chills, dizziness, HA, Changes in vision, CP, dyspnea, nausea or vomiting, dysuria, changes in bowel movements, LE edema. Rest of 12 point Review of systems negative unless otherwise documented elsewhere in note.     Diet, NPO:   With Ice Chips/Sips of Water (12-03-23 @ 09:16) [Active]      MEDICATIONS:  MEDICATIONS  (STANDING):  dextrose 5% + sodium chloride 0.9% with potassium chloride 20 mEq/L 1000 milliLiter(s) (60 mL/Hr) IV Continuous <Continuous>  heparin   Injectable 5000 Unit(s) SubCutaneous every 8 hours  lidocaine   4% Patch 1 Patch Transdermal daily  mycophenolate mofetil IVPB 1000 milliGRAM(s) IV Intermittent daily  pantoprazole  Injectable 40 milliGRAM(s) IV Push daily    MEDICATIONS  (PRN):  acetaminophen   IVPB .. 1000 milliGRAM(s) IV Intermittent once PRN Temp greater or equal to 38C (100.4F), Mild Pain (1 - 3)  HYDROmorphone  Injectable 0.5 milliGRAM(s) IV Push every 4 hours PRN Severe Pain (7 - 10)  ketorolac   Injectable 15 milliGRAM(s) IV Push every 4 hours PRN Moderate Pain (4 - 6)      Allergies    No Known Allergies    Intolerances        OBJECTIVE:  Vital Signs Last 24 Hrs  T(C): 36.5 (03 Dec 2023 09:28), Max: 37.2 (02 Dec 2023 21:37)  T(F): 97.7 (03 Dec 2023 09:28), Max: 98.9 (02 Dec 2023 21:37)  HR: 87 (03 Dec 2023 08:30) (68 - 87)  BP: 110/74 (03 Dec 2023 08:30) (109/71 - 132/79)  BP(mean): 87 (03 Dec 2023 08:30) (81 - 100)  RR: 16 (03 Dec 2023 08:30) (14 - 16)  SpO2: 96% (03 Dec 2023 08:30) (95% - 99%)    Parameters below as of 03 Dec 2023 08:30  Patient On (Oxygen Delivery Method): room air      I&O's Summary    02 Dec 2023 07:01  -  03 Dec 2023 07:00  --------------------------------------------------------  IN: 1366 mL / OUT: 1075 mL / NET: 291 mL        PHYSICAL EXAM:  Gen: Reclining in bed at time of exam, appears stated age  HEENT: NCAT, MMM, clear OP  Neck: supple, trachea at midline  CV: RRR, +S1/S2  Pulm: adequate respiratory effort, no increase in work of breathing  Abd: soft, NTND  Skin: warm and dry,   Ext: WWP, no LE edema  Neuro: AOx3, no gross focal neurological deficits  Psych: affect and behavior appropriate, pleasant at time of interview  :     LABS:                        13.9   10.87 )-----------( 185      ( 03 Dec 2023 05:33 )             41.3     12-03    141  |  106  |  15  ----------------------------<  89  3.8   |  27  |  0.77    Ca    8.2<L>      03 Dec 2023 05:33  Mg     2.1     12-03        PT/INR - ( 01 Dec 2023 11:32 )   PT: 11.7 sec;   INR: 1.03          PTT - ( 01 Dec 2023 11:32 )  PTT:39.3 sec  CAPILLARY BLOOD GLUCOSE        Urinalysis Basic - ( 03 Dec 2023 05:33 )    Color: x / Appearance: x / SG: x / pH: x  Gluc: 89 mg/dL / Ketone: x  / Bili: x / Urobili: x   Blood: x / Protein: x / Nitrite: x   Leuk Esterase: x / RBC: x / WBC x   Sq Epi: x / Non Sq Epi: x / Bacteria: x        MICRODATA:      RADIOLOGY/OTHER STUDIES:    PCP  Pharmacy:   Emergency contact:

## 2023-12-03 NOTE — PROGRESS NOTE ADULT - ASSESSMENT
61 YO Male w/ PMHx of myasthenia gravis, s/p right video-assisted thoracoscopic surgery for thymectomy (2018), per pt, post-op was c/b respiratory failure s/p tracheotomy and PEG placement. He is referred by Dr. Robert Bang for an initial evaluation of newly diagnosed gastric adenocarcinoma at [facility]. Pt was referred to Dr. Byrd for staging. Based on the EUS, it was determined to be early-stage gastric cancer and patient referred to Dr. Fitzgerald for surgical intervention. On 12/01/2 he underwent, EGD, laparoscopic robotic assisted gastrectomy and Pato en Y reconstruction. Patient recovered in PACU then 9LA. Seen by neuro team for management of myasthenia gravis, recs appreciated. POD1 patient doing well, paul removed and passed TOV. POD2 Keeping NPO until esophagram tomorrow.    Plan:    Neurovascular:   -Hx of myasthenia gravis  -Cont Cellcept  -Neuro following  -Pain well controlled with current regimen. PRN's: Tylenol, Toradol, Dilaudid    Cardiovascular:   -Hemodynamically stable.   -Monitor: BP, HR, tele    Respiratory:   -Oxygenating well on room air  -Encourage continued use of IS 10x/hr and frequent ambulation  -CXR: stable    GI:  -Gastric cancer s/p  EGD, laparoscopic robotic assisted gastrectomy and Pato en Y reconstruction on 12/1/23  -Keep NPO  -Plan for esophagram Monday before advancing diet  -GI PPX: IV protonix  -NPO  -Bowel Regimen: none    Renal / :  -Continue to monitor renal function: BUN/Cr: 15/0.77  -Monitor I/O's daily     Endocrine:    -No hx of DM or thyroid dx  -A1c: not obtained  -TSH: not obtained    Hematologic:  -CBC: H/H- 13.9/41.3  -Coagulation Panel.    ID:  -Temperature: Afebrile   -CBC: WBC- 10  -Continue to observe for SIRS/Sepsis Syndrome.    Prophylaxis:  -DVT prophylaxis with 5000 SubQ Heparin q8h.  -Continue with SCD's b/l while patient is at rest     Disposition:  -Esophagram tomorrow 61 YO Male w/ PMHx of myasthenia gravis, s/p right video-assisted thoracoscopic surgery for thymectomy (2018), per pt, post-op was c/b respiratory failure s/p tracheotomy and PEG placement. He is referred by Dr. Robert Bang for an initial evaluation of newly diagnosed gastric adenocarcinoma at [facility]. Pt was referred to Dr. Byrd for staging. Based on the EUS, it was determined to be early-stage gastric cancer and patient referred to Dr. Fitzgerald for surgical intervention. On 12/01/2 he underwent, EGD, laparoscopic robotic assisted gastrectomy and Pato en Y reconstruction. Patient recovered in PACU then 9LA. Seen by neuro team for management of myasthenia gravis, recs appreciated. POD1 patient doing well, paul removed and passed TOV. POD2 Keeping NPO until esophagram tomorrow.    Plan:    Neurovascular:   -Hx of myasthenia gravis  -Cont Cellcept  -Neuro following  -Pain well controlled with current regimen. PRN's: Tylenol, Toradol, Dilaudid    Cardiovascular:   -Hemodynamically stable.   -Monitor: BP, HR, tele    Respiratory:   -Oxygenating well on room air  -Encourage continued use of IS 10x/hr and frequent ambulation  -CXR: stable    GI:  -Gastric cancer s/p  EGD, laparoscopic robotic assisted gastrectomy and Pato en Y reconstruction on 12/1/23  -Keep NPO  -Plan for esophagram Monday before advancing diet  -GI PPX: IV protonix  -NPO  -Bowel Regimen: none    Renal / :  -Continue to monitor renal function: BUN/Cr: 15/0.77  -Monitor I/O's daily     Endocrine:    -No hx of DM or thyroid dx  -A1c: not obtained  -TSH: not obtained    Hematologic:  -CBC: H/H- 13.9/41.3  -Coagulation Panel.    ID:  -Temperature: Afebrile   -CBC: WBC- 10  -Continue to observe for SIRS/Sepsis Syndrome.    Prophylaxis:  -DVT prophylaxis with 5000 SubQ Heparin q8h.  -Continue with SCD's b/l while patient is at rest     Disposition:  -Esophagram tomorrow 61 YO Male w/ PMHx of myasthenia gravis, s/p right video-assisted thoracoscopic surgery for thymectomy (2018), per pt, post-op was c/b respiratory failure s/p tracheotomy and PEG placement. He is referred by Dr. Robert Bang for an initial evaluation of newly diagnosed gastric adenocarcinoma at [facility]. Pt was referred to Dr. Byrd for staging. Based on the EUS, it was determined to be early-stage gastric cancer and patient referred to Dr. Fitzgerald for surgical intervention. On 12/01/2 he underwent, EGD, laparoscopic robotic assisted gastrectomy and Pato en Y reconstruction. Patient recovered in PACU then 9LA. Seen by neuro team for management of myasthenia gravis, recs appreciated. POD1 patient doing well, paul removed and passed TOV. POD2 Keeping NPO until esophagram tomorrow.    Plan:    Neurovascular:   -Hx of myasthenia gravis  -Cont Cellcept  -Neuro following  -Pain well controlled with current regimen. PRN's: Tylenol, Toradol, Dilaudid    Cardiovascular:   -Hemodynamically stable.   -Monitor: BP, HR, tele    Respiratory:   -Oxygenating well on room air  -Encourage continued use of IS 10x/hr and frequent ambulation  -CXR: stable    GI:  -Gastric cancer s/p  EGD, laparoscopic robotic assisted gastrectomy and Pato en Y reconstruction on 12/1/23  -Keep NPO  -Plan for esophagram Monday before advancing diet  -GI PPX: IV protonix  -NPO  -Bowel Regimen: none    Renal / :  -Continue to monitor renal function: BUN/Cr: 15/0.77  -Monitor I/O's daily     Endocrine:    -No hx of DM or thyroid dx  -A1c: not obtained  -TSH: not obtained    Hematologic:  -CBC: H/H- 13.9/41.3  -Coagulation Panel.    ID:  -Temperature: Afebrile   -CBC: WBC- 10  -Continue to observe for SIRS/Sepsis Syndrome.    Prophylaxis:  -DVT prophylaxis with 5000 SubQ Heparin q8h.  -Continue with SCD's b/l while patient is at rest     Disposition:  -Esophagram tomorrow 63 YO Male w/ PMHx of myasthenia gravis, s/p right video-assisted thoracoscopic surgery for thymectomy (2018), per pt, post-op was c/b respiratory failure s/p tracheotomy and PEG placement. He is referred by Dr. Robert Bang for an initial evaluation [facility] Pt was referred to Dr. Byrd for staging. Based on the EUS, it was determined to be early-stage gastric cancer and patient referred to Dr. Fitzgerald for surgical intervention. On 12/01/2 he underwent, EGD, laparoscopic robotic assisted gastrectomy and Pato en Y reconstruction. Patient recovered in PACU then 9LA. Seen by neuro team for management of myasthenia gravis, recs appreciated. POD1 patient doing well, paul removed and passed TOV. POD2 Started sips and chips diet. Plan for UGI series w/ SBFT tomorrow.    Plan:    Neurovascular:   -Hx of myasthenia gravis  -Cont Cellcept  -Neuro following  -Pain well controlled with current regimen. PRN's: Tylenol, Toradol, Dilaudid    Cardiovascular:   -Hemodynamically stable.   -Monitor: BP, HR, tele    Respiratory:   -Oxygenating well on room air  -Encourage continued use of IS 10x/hr and frequent ambulation  -CXR: stable    GI:  -Gastric cancer s/p  EGD, laparoscopic robotic assisted gastrectomy and Pato en Y reconstruction on 12/1/23  -Keep NPO w/ sips and chips  -Plan for UGI series w/ SBFT tomorrow  -GI PPX: IV protonix  -NPO  -Bowel Regimen: none    Renal / :  -Continue to monitor renal function: BUN/Cr: 15/0.77  -Monitor I/O's daily     Endocrine:    -No hx of DM or thyroid dx  -A1c: not obtained  -TSH: not obtained    Hematologic:  -CBC: H/H- 13.9/41.3  -Coagulation Panel.    ID:  -Temperature: Afebrile   -CBC: WBC- 10  -Continue to observe for SIRS/Sepsis Syndrome.    Prophylaxis:  -DVT prophylaxis with 5000 SubQ Heparin q8h.  -Continue with SCD's b/l while patient is at rest     Disposition:  -Esophagram tomorrow 63 YO Male w/ PMHx of myasthenia gravis, s/p right video-assisted thoracoscopic surgery for thymectomy (2018), per pt, post-op was c/b respiratory failure s/p tracheotomy and PEG placement. He is referred by Dr. Robert Bang for [facility] Pt was referred to Dr. Byrd for staging. Based on the EUS, it was determined to be early-stage gastric cancer and patient referred to Dr. Fitzgerald for surgical intervention. On 12/01/2 he underwent, EGD, laparoscopic robotic assisted gastrectomy and Pato en Y reconstruction. Patient recovered in PACU then 9LA. Seen by neuro team for management of myasthenia gravis, recs appreciated. POD1 patient doing well, paul removed and passed TOV. POD2 Started sips and chips diet. Plan for UGI series w/ SBFT tomorrow.    Plan:    Neurovascular:   -Hx of myasthenia gravis  -Cont Cellcept  -Neuro following  -Pain well controlled with current regimen. PRN's: Tylenol, Toradol, Dilaudid    Cardiovascular:   -Hemodynamically stable.   -Monitor: BP, HR, tele    Respiratory:   -Oxygenating well on room air  -Encourage continued use of IS 10x/hr and frequent ambulation  -CXR: stable    GI:  -Gastric cancer s/p  EGD, laparoscopic robotic assisted gastrectomy and Pato en Y reconstruction on 12/1/23  -Keep NPO w/ sips and chips  -Plan for UGI series w/ SBFT tomorrow  -GI PPX: IV protonix  -NPO  -Bowel Regimen: none    Renal / :  -Continue to monitor renal function: BUN/Cr: 15/0.77  -Monitor I/O's daily     Endocrine:    -No hx of DM or thyroid dx  -A1c: not obtained  -TSH: not obtained    Hematologic:  -CBC: H/H- 13.9/41.3  -Coagulation Panel.    ID:  -Temperature: Afebrile   -CBC: WBC- 10  -Continue to observe for SIRS/Sepsis Syndrome.    Prophylaxis:  -DVT prophylaxis with 5000 SubQ Heparin q8h.  -Continue with SCD's b/l while patient is at rest     Disposition:  -Esophagram tomorrow 63 YO Male w/ PMHx of myasthenia gravis, s/p right video-assisted thoracoscopic surgery for thymectomy (2018), per pt, post-op was c/b respiratory failure s/p tracheotomy and PEG placement. He is referred by Dr. Robert Bang for [facility] Pt was referred to Dr. Byrd for staging. Based on the EUS, it was determined to be early-stage gastric cancer and patient referred to Dr. Fitzgerald for surgical intervention. On 12/01/2 he underwent, EGD, laparoscopic robotic assisted gastrectomy and Pato en Y reconstruction. Patient recovered in PACU then 9LA. Seen by neuro team for management of myasthenia gravis, recs appreciated. POD1 patient doing well, paul removed and passed TOV. POD2 Started sips and chips diet. Plan for UGI series w/ SBFT tomorrow.    Plan:    Neurovascular:   -Hx of myasthenia gravis  -Cont Cellcept  -Neuro following  -Pain well controlled with current regimen. PRN's: Tylenol, Toradol, Dilaudid    Cardiovascular:   -Hemodynamically stable.   -Monitor: BP, HR, tele    Respiratory:   -Oxygenating well on room air  -Encourage continued use of IS 10x/hr and frequent ambulation  -CXR: stable    GI:  -Gastric cancer s/p  EGD, laparoscopic robotic assisted gastrectomy and Pato en Y reconstruction on 12/1/23  -Keep NPO w/ sips and chips  -Plan for UGI series w/ SBFT tomorrow  -GI PPX: IV protonix  -NPO  -Bowel Regimen: none    Renal / :  -Continue to monitor renal function: BUN/Cr: 15/0.77  -Monitor I/O's daily     Endocrine:    -No hx of DM or thyroid dx  -A1c: not obtained  -TSH: not obtained    Hematologic:  -CBC: H/H- 13.9/41.3  -Coagulation Panel.    ID:  -Temperature: Afebrile   -CBC: WBC- 10  -Continue to observe for SIRS/Sepsis Syndrome.    Prophylaxis:  -DVT prophylaxis with 5000 SubQ Heparin q8h.  -Continue with SCD's b/l while patient is at rest     Disposition:  -Esophagram tomorrow

## 2023-12-03 NOTE — PROGRESS NOTE ADULT - SUBJECTIVE AND OBJECTIVE BOX
Patient discussed on morning rounds with Dr. Fitzgerald    Operation / Date: EGD, laparoscopic robotic assisted gastrectomy and Pato en Y reconstruction on 12/1/23    SUBJECTIVE ASSESSMENT: Patient seen and examined at bedside.     Vital Signs Last 24 Hrs  T(C): 36.3 (03 Dec 2023 05:01), Max: 37.2 (02 Dec 2023 09:17)  T(F): 97.4 (03 Dec 2023 05:01), Max: 98.9 (02 Dec 2023 09:17)  HR: 69 (03 Dec 2023 05:08) (66 - 84)  BP: 132/79 (03 Dec 2023 05:08) (109/71 - 132/79)  BP(mean): 100 (03 Dec 2023 05:08) (81 - 100)  RR: 16 (03 Dec 2023 05:08) (14 - 17)  SpO2: 99% (03 Dec 2023 05:08) (95% - 99%)    Parameters below as of 03 Dec 2023 05:08  Patient On (Oxygen Delivery Method): room air    I&O's Detail    02 Dec 2023 07:01  -  03 Dec 2023 07:00  --------------------------------------------------------  IN:    IV PiggyBack: 266 mL    Lactated Ringers: 1000 mL  Total IN: 1266 mL    OUT:    Indwelling Catheter - Urethral (mL): 300 mL    Voided (mL): 525 mL  Total OUT: 825 mL    Total NET: 441 mL    CHEST TUBE:  None  JENNA DRAIN:   None  EPICARDIAL WIRES:  None  TIE DOWNS:  None  STUART:  None    PHYSICAL EXAM:  GENERAL: NAD, lying in bed comfortably  HEAD:  Atraumatic, Normocephalic  EYES: EOMI, PERRLA, conjunctiva and sclera clear  ENT: Moist mucous membranes  NECK: Supple, No JVD  CHEST/LUNG: CTAB  HEART: RRR  ABDOMEN: Soft, Nondistended, appropriately ttp; Incision sites well-approximated with Dermabond.   EXTREMITIES:  2+ Peripheral Pulses, brisk capillary refill. No clubbing, cyanosis, or edema  NERVOUS SYSTEM:  Alert & Oriented X3, speech clear. No deficits     LABS:                        13.9   10.87 )-----------( 185      ( 03 Dec 2023 05:33 )             41.3     PT/INR - ( 01 Dec 2023 11:32 )   PT: 11.7 sec;   INR: 1.03        PTT - ( 01 Dec 2023 11:32 )  PTT:39.3 sec    12-03    141  |  106  |  15  ----------------------------<  89  3.8   |  27  |  0.77    Ca    8.2<L>      03 Dec 2023 05:33  Mg     2.1     12-03    Urinalysis Basic - ( 03 Dec 2023 05:33 )    Color: x / Appearance: x / SG: x / pH: x  Gluc: 89 mg/dL / Ketone: x  / Bili: x / Urobili: x   Blood: x / Protein: x / Nitrite: x   Leuk Esterase: x / RBC: x / WBC x   Sq Epi: x / Non Sq Epi: x / Bacteria: x    MEDICATIONS  (STANDING):  heparin   Injectable 5000 Unit(s) SubCutaneous every 8 hours  lactated ringers. 1000 milliLiter(s) (60 mL/Hr) IV Continuous <Continuous>  lidocaine   4% Patch 1 Patch Transdermal daily  mycophenolate mofetil IVPB 1000 milliGRAM(s) IV Intermittent daily  pantoprazole  Injectable 40 milliGRAM(s) IV Push daily  potassium chloride  10 mEq/100 mL IVPB 10 milliEquivalent(s) IV Intermittent every 1 hour    MEDICATIONS  (PRN):  acetaminophen   IVPB .. 1000 milliGRAM(s) IV Intermittent once PRN Temp greater or equal to 38C (100.4F), Mild Pain (1 - 3)  HYDROmorphone  Injectable 0.5 milliGRAM(s) IV Push every 4 hours PRN Severe Pain (7 - 10)  ketorolac   Injectable 15 milliGRAM(s) IV Push every 4 hours PRN Moderate Pain (4 - 6)    RADIOLOGY & ADDITIONAL TESTS:     Patient discussed on morning rounds with Dr. Fitzgerald    Operation / Date: EGD, laparoscopic robotic assisted gastrectomy and Pato en Y reconstruction on 12/1/23    SUBJECTIVE ASSESSMENT: Patient seen and examined at bedside with Dr. Fitzgerald. Offers no complaints. Voiding. Denies chills, chest pain, SOB, palpitations, N/V.     Vital Signs Last 24 Hrs  T(C): 36.3 (03 Dec 2023 05:01), Max: 37.2 (02 Dec 2023 09:17)  T(F): 97.4 (03 Dec 2023 05:01), Max: 98.9 (02 Dec 2023 09:17)  HR: 69 (03 Dec 2023 05:08) (66 - 84)  BP: 132/79 (03 Dec 2023 05:08) (109/71 - 132/79)  BP(mean): 100 (03 Dec 2023 05:08) (81 - 100)  RR: 16 (03 Dec 2023 05:08) (14 - 17)  SpO2: 99% (03 Dec 2023 05:08) (95% - 99%)    Parameters below as of 03 Dec 2023 05:08  Patient On (Oxygen Delivery Method): room air    I&O's Detail    02 Dec 2023 07:01  -  03 Dec 2023 07:00  --------------------------------------------------------  IN:    IV PiggyBack: 266 mL    Lactated Ringers: 1000 mL  Total IN: 1266 mL    OUT:    Indwelling Catheter - Urethral (mL): 300 mL    Voided (mL): 525 mL  Total OUT: 825 mL    Total NET: 441 mL    CHEST TUBE:  None  JENNA DRAIN:   None  EPICARDIAL WIRES:  None  TIE DOWNS:  None  STUART:  None    PHYSICAL EXAM:  GENERAL: NAD, lying in bed comfortably  HEAD:  Atraumatic, Normocephalic  EYES: EOMI, PERRLA, conjunctiva and sclera clear  ENT: Moist mucous membranes  NECK: Supple, No JVD  CHEST/LUNG: CTAB  HEART: RRR  ABDOMEN: Soft, Nondistended, appropriately ttp; Incision sites well-approximated with Dermabond.   EXTREMITIES:  2+ Peripheral Pulses, brisk capillary refill. No clubbing, cyanosis, or edema  NERVOUS SYSTEM:  Alert & Oriented X3, speech clear. No deficits     LABS:                        13.9   10.87 )-----------( 185      ( 03 Dec 2023 05:33 )             41.3     PT/INR - ( 01 Dec 2023 11:32 )   PT: 11.7 sec;   INR: 1.03        PTT - ( 01 Dec 2023 11:32 )  PTT:39.3 sec    12-03    141  |  106  |  15  ----------------------------<  89  3.8   |  27  |  0.77    Ca    8.2<L>      03 Dec 2023 05:33  Mg     2.1     12-03    Urinalysis Basic - ( 03 Dec 2023 05:33 )    Color: x / Appearance: x / SG: x / pH: x  Gluc: 89 mg/dL / Ketone: x  / Bili: x / Urobili: x   Blood: x / Protein: x / Nitrite: x   Leuk Esterase: x / RBC: x / WBC x   Sq Epi: x / Non Sq Epi: x / Bacteria: x    MEDICATIONS  (STANDING):  heparin   Injectable 5000 Unit(s) SubCutaneous every 8 hours  lactated ringers. 1000 milliLiter(s) (60 mL/Hr) IV Continuous <Continuous>  lidocaine   4% Patch 1 Patch Transdermal daily  mycophenolate mofetil IVPB 1000 milliGRAM(s) IV Intermittent daily  pantoprazole  Injectable 40 milliGRAM(s) IV Push daily  potassium chloride  10 mEq/100 mL IVPB 10 milliEquivalent(s) IV Intermittent every 1 hour    MEDICATIONS  (PRN):  acetaminophen   IVPB .. 1000 milliGRAM(s) IV Intermittent once PRN Temp greater or equal to 38C (100.4F), Mild Pain (1 - 3)  HYDROmorphone  Injectable 0.5 milliGRAM(s) IV Push every 4 hours PRN Severe Pain (7 - 10)  ketorolac   Injectable 15 milliGRAM(s) IV Push every 4 hours PRN Moderate Pain (4 - 6)    RADIOLOGY & ADDITIONAL TESTS:

## 2023-12-03 NOTE — PROGRESS NOTE ADULT - ASSESSMENT
BOBO DODSON is a 62-year-old M, Mandarin speaking, nonsmoker, with PMHx of myasthenia gravis, s/p right video-assisted thoracoscopic surgery for thymectomy in 2018. As per patient, post-op was complicated with respiratory failure s/p tracheotomy and PEG placement. He is referred by GI, Dr. Elke Bang for an initial evaluation of newly diagnosed gastric adenocarcinoma at [facility]. Pt was referred to advance GI, Dr. Byrd for staging. Based on the EUS, it is an early-stage gastric cancer and surgical gastrectomy is the recommendation. Pt underwent an elective robotic assisted distal gastrectomy with Pato en Y reconstruction on 12/1/23.  POD 2    - 9LA for hemodynamic monitoring   - Neuro consult appreciated rec: MG  - Continue Cellcept 1000mg IV QD while NPO (home med: mycophenolate mofetil 250 mg oral capsule: 4 cap(s) orally once a day)   - Please avoid magnesium sulfate in MG patients  - Please avoid medications that could potentially exacerbate MG (such as some antibiotics, statins, and propanolol to name a few)  - taperred off O2 supplement   - NPO/plan for swallowing study tomorrow on Monday 12/4   - IVF with lactated ringers. 1000 milliLiter(s) IV Continuous <Continuous>  - postop iv abx: ceFAZolin   IVPB 2000 milliGRAM(s) IV Intermittent every 8 hours x3 only per protocol  - pain management   lidocaine   4% Patch 1 Patch Transdermal daily  acetaminophen   IVPB .. 1000 milliGRAM(s) IV Intermittent once PRN  ketorolac   Injectable 15 milliGRAM(s) IV Push every 4 hours PRN  HYDROmorphone  Injectable 0.5 milliGRAM(s) IV Push every 4 hours PRN  - GI ppx: pantoprazole  Injectable 40 milliGRAM(s) IV Push daily  - DVT ppx: heparin   Injectable 5000 Unit(s) SubCutaneous every 8 hours  - held oral home meds while NPO (mycophenolate mofetil 250 mg oral capsule: 4 cap(s) orally once a day (01 Dec 2023 06:40)     REF/GASTRO: DR. ELKE BANG   P:[phone]    PCP:  AMANDA [facility]  [phone]    Disposition: medically active , awaiting swallowing study tomorrow 12/4     POC as d/w CTS [facility]     Thank you for the consult, will continue follow pt with you.    BOBO DODSON is a 62-year-old M, Mandarin speaking, nonsmoker, with PMHx of myasthenia gravis, s/p right video-assisted thoracoscopic surgery for thymectomy in 2018. As per patient, post-op was complicated with respiratory failure s/p tracheotomy and PEG placement. He is referred by GI, Dr. Elke Bang for an initial evaluation of newly diagnosed gastric adenocarcinoma at [facility]. Pt was referred to advance GI, Dr. Byrd for staging. Based on the EUS, it is an early-stage gastric cancer and surgical gastrectomy is the recommendation. Pt underwent an elective robotic assisted distal gastrectomy with Pato en Y reconstruction on 12/1/23.  POD 2    - 9LA for hemodynamic monitoring   - Neuro consult appreciated rec: MG  - Continue Cellcept 1000mg IV QD while NPO (home med: mycophenolate mofetil 250 mg oral capsule: 4 cap(s) orally once a day)   - Please avoid magnesium sulfate in MG patients  - Please avoid medications that could potentially exacerbate MG (such as some antibiotics, statins, and propanolol to name a few)  - taperred off O2 supplement   - NPO/plan for swallowing study tomorrow on Monday 12/4   - IVF with lactated ringers. 1000 milliLiter(s) IV Continuous <Continuous>  - postop iv abx: ceFAZolin   IVPB 2000 milliGRAM(s) IV Intermittent every 8 hours x3 only per protocol  - pain management   lidocaine   4% Patch 1 Patch Transdermal daily  acetaminophen   IVPB .. 1000 milliGRAM(s) IV Intermittent once PRN  ketorolac   Injectable 15 milliGRAM(s) IV Push every 4 hours PRN  HYDROmorphone  Injectable 0.5 milliGRAM(s) IV Push every 4 hours PRN  - GI ppx: pantoprazole  Injectable 40 milliGRAM(s) IV Push daily  - DVT ppx: heparin   Injectable 5000 Unit(s) SubCutaneous every 8 hours  - held oral home meds while NPO (mycophenolate mofetil 250 mg oral capsule: 4 cap(s) orally once a day (01 Dec 2023 06:40)     REF/GASTRO: DR. ELKE BANG   P:[phone]    PCP:  AMANDA [facility]  [phone]    Disposition: medically active , awaiting swallowing study tomorrow 12/4     POC as d/w CTS [facility]     Thank you for the consult, will continue follow pt with you.    BOBO DODSON is a 62-year-old M, Mandarin speaking, nonsmoker, with PMHx of myasthenia gravis, s/p right video-assisted thoracoscopic surgery for thymectomy in 2018. As per patient, post-op was complicated with respiratory failure s/p tracheotomy and PEG placement. He is referred by GI, Dr. Elke Bang for an initial evaluation of newly diagnosed gastric adenocarcinoma at [facility]. Pt was referred to advance GI, Dr. Byrd for staging. Based on the EUS, it is an early-stage gastric cancer and surgical gastrectomy is the recommendation. Pt underwent an elective robotic assisted distal gastrectomy with Pato en Y reconstruction on 12/1/23.  POD 2    - 9LA for hemodynamic monitoring   - Neuro consult appreciated rec: MG  - Continue Cellcept 1000mg IV QD while NPO (home med: mycophenolate mofetil 250 mg oral capsule: 4 cap(s) orally once a day)   - Please avoid magnesium sulfate in MG patients  - Please avoid medications that could potentially exacerbate MG (such as some antibiotics, statins, and propanolol to name a few)  - taperred off O2 supplement   - NPO/plan for swallowing study tomorrow on Monday 12/4   - IVF with lactated ringers. 1000 milliLiter(s) IV Continuous <Continuous>  - postop iv abx: ceFAZolin   IVPB 2000 milliGRAM(s) IV Intermittent every 8 hours x3 only per protocol  - pain management   lidocaine   4% Patch 1 Patch Transdermal daily  acetaminophen   IVPB .. 1000 milliGRAM(s) IV Intermittent once PRN  ketorolac   Injectable 15 milliGRAM(s) IV Push every 4 hours PRN  HYDROmorphone  Injectable 0.5 milliGRAM(s) IV Push every 4 hours PRN  - GI ppx: pantoprazole  Injectable 40 milliGRAM(s) IV Push daily  - DVT ppx: heparin   Injectable 5000 Unit(s) SubCutaneous every 8 hours  - held oral home meds while NPO (mycophenolate mofetil 250 mg oral capsule: 4 cap(s) orally once a day (01 Dec 2023 06:40)     REF/GASTRO: DR. ELKE BANG   P:[phone]    PCP:  AMANDA [facility]  [phone]    Disposition: medically active , awaiting swallowing study tomorrow 12/4     POC as d/w CTS [facility]     Thank you for the consult, will continue follow pt with you.

## 2023-12-04 LAB
ANION GAP SERPL CALC-SCNC: 7 MMOL/L — SIGNIFICANT CHANGE UP (ref 5–17)
ANION GAP SERPL CALC-SCNC: 7 MMOL/L — SIGNIFICANT CHANGE UP (ref 5–17)
BUN SERPL-MCNC: 12 MG/DL — SIGNIFICANT CHANGE UP (ref 7–23)
BUN SERPL-MCNC: 12 MG/DL — SIGNIFICANT CHANGE UP (ref 7–23)
CALCIUM SERPL-MCNC: 7.9 MG/DL — LOW (ref 8.4–10.5)
CALCIUM SERPL-MCNC: 7.9 MG/DL — LOW (ref 8.4–10.5)
CHLORIDE SERPL-SCNC: 107 MMOL/L — SIGNIFICANT CHANGE UP (ref 96–108)
CHLORIDE SERPL-SCNC: 107 MMOL/L — SIGNIFICANT CHANGE UP (ref 96–108)
CO2 SERPL-SCNC: 24 MMOL/L — SIGNIFICANT CHANGE UP (ref 22–31)
CO2 SERPL-SCNC: 24 MMOL/L — SIGNIFICANT CHANGE UP (ref 22–31)
CREAT SERPL-MCNC: 0.67 MG/DL — SIGNIFICANT CHANGE UP (ref 0.5–1.3)
CREAT SERPL-MCNC: 0.67 MG/DL — SIGNIFICANT CHANGE UP (ref 0.5–1.3)
EGFR: 104 ML/MIN/1.73M2 — SIGNIFICANT CHANGE UP
EGFR: 104 ML/MIN/1.73M2 — SIGNIFICANT CHANGE UP
GLUCOSE SERPL-MCNC: 86 MG/DL — SIGNIFICANT CHANGE UP (ref 70–99)
GLUCOSE SERPL-MCNC: 86 MG/DL — SIGNIFICANT CHANGE UP (ref 70–99)
HCT VFR BLD CALC: 40 % — SIGNIFICANT CHANGE UP (ref 39–50)
HCT VFR BLD CALC: 40 % — SIGNIFICANT CHANGE UP (ref 39–50)
HGB BLD-MCNC: 13.2 G/DL — SIGNIFICANT CHANGE UP (ref 13–17)
HGB BLD-MCNC: 13.2 G/DL — SIGNIFICANT CHANGE UP (ref 13–17)
MAGNESIUM SERPL-MCNC: 1.9 MG/DL — SIGNIFICANT CHANGE UP (ref 1.6–2.6)
MAGNESIUM SERPL-MCNC: 1.9 MG/DL — SIGNIFICANT CHANGE UP (ref 1.6–2.6)
MCHC RBC-ENTMCNC: 31.4 PG — SIGNIFICANT CHANGE UP (ref 27–34)
MCHC RBC-ENTMCNC: 31.4 PG — SIGNIFICANT CHANGE UP (ref 27–34)
MCHC RBC-ENTMCNC: 33 GM/DL — SIGNIFICANT CHANGE UP (ref 32–36)
MCHC RBC-ENTMCNC: 33 GM/DL — SIGNIFICANT CHANGE UP (ref 32–36)
MCV RBC AUTO: 95.2 FL — SIGNIFICANT CHANGE UP (ref 80–100)
MCV RBC AUTO: 95.2 FL — SIGNIFICANT CHANGE UP (ref 80–100)
NRBC # BLD: 0 /100 WBCS — SIGNIFICANT CHANGE UP (ref 0–0)
NRBC # BLD: 0 /100 WBCS — SIGNIFICANT CHANGE UP (ref 0–0)
PLATELET # BLD AUTO: 183 K/UL — SIGNIFICANT CHANGE UP (ref 150–400)
PLATELET # BLD AUTO: 183 K/UL — SIGNIFICANT CHANGE UP (ref 150–400)
POTASSIUM SERPL-MCNC: 3.8 MMOL/L — SIGNIFICANT CHANGE UP (ref 3.5–5.3)
POTASSIUM SERPL-MCNC: 3.8 MMOL/L — SIGNIFICANT CHANGE UP (ref 3.5–5.3)
POTASSIUM SERPL-SCNC: 3.8 MMOL/L — SIGNIFICANT CHANGE UP (ref 3.5–5.3)
POTASSIUM SERPL-SCNC: 3.8 MMOL/L — SIGNIFICANT CHANGE UP (ref 3.5–5.3)
RBC # BLD: 4.2 M/UL — SIGNIFICANT CHANGE UP (ref 4.2–5.8)
RBC # BLD: 4.2 M/UL — SIGNIFICANT CHANGE UP (ref 4.2–5.8)
RBC # FLD: 11.9 % — SIGNIFICANT CHANGE UP (ref 10.3–14.5)
RBC # FLD: 11.9 % — SIGNIFICANT CHANGE UP (ref 10.3–14.5)
SODIUM SERPL-SCNC: 138 MMOL/L — SIGNIFICANT CHANGE UP (ref 135–145)
SODIUM SERPL-SCNC: 138 MMOL/L — SIGNIFICANT CHANGE UP (ref 135–145)
WBC # BLD: 7.29 K/UL — SIGNIFICANT CHANGE UP (ref 3.8–10.5)
WBC # BLD: 7.29 K/UL — SIGNIFICANT CHANGE UP (ref 3.8–10.5)
WBC # FLD AUTO: 7.29 K/UL — SIGNIFICANT CHANGE UP (ref 3.8–10.5)
WBC # FLD AUTO: 7.29 K/UL — SIGNIFICANT CHANGE UP (ref 3.8–10.5)

## 2023-12-04 PROCEDURE — 99232 SBSQ HOSP IP/OBS MODERATE 35: CPT

## 2023-12-04 PROCEDURE — 74240 X-RAY XM UPR GI TRC 1CNTRST: CPT | Mod: 26

## 2023-12-04 RX ORDER — OXYCODONE HYDROCHLORIDE 5 MG/1
10 TABLET ORAL EVERY 6 HOURS
Refills: 0 | Status: DISCONTINUED | OUTPATIENT
Start: 2023-12-04 | End: 2023-12-05

## 2023-12-04 RX ORDER — PANTOPRAZOLE SODIUM 20 MG/1
40 TABLET, DELAYED RELEASE ORAL
Refills: 0 | Status: DISCONTINUED | OUTPATIENT
Start: 2023-12-05 | End: 2023-12-05

## 2023-12-04 RX ORDER — OXYCODONE HYDROCHLORIDE 5 MG/1
5 TABLET ORAL EVERY 6 HOURS
Refills: 0 | Status: DISCONTINUED | OUTPATIENT
Start: 2023-12-04 | End: 2023-12-05

## 2023-12-04 RX ORDER — ACETAMINOPHEN 500 MG
975 TABLET ORAL EVERY 6 HOURS
Refills: 0 | Status: DISCONTINUED | OUTPATIENT
Start: 2023-12-04 | End: 2023-12-05

## 2023-12-04 RX ORDER — MYCOPHENOLATE MOFETIL 250 MG/1
1000 CAPSULE ORAL DAILY
Refills: 0 | Status: DISCONTINUED | OUTPATIENT
Start: 2023-12-05 | End: 2023-12-05

## 2023-12-04 RX ORDER — POTASSIUM CHLORIDE 20 MEQ
20 PACKET (EA) ORAL ONCE
Refills: 0 | Status: COMPLETED | OUTPATIENT
Start: 2023-12-04 | End: 2023-12-04

## 2023-12-04 RX ADMIN — MYCOPHENOLATE MOFETIL 83.34 MILLIGRAM(S): 250 CAPSULE ORAL at 11:43

## 2023-12-04 RX ADMIN — HEPARIN SODIUM 5000 UNIT(S): 5000 INJECTION INTRAVENOUS; SUBCUTANEOUS at 16:12

## 2023-12-04 RX ADMIN — PANTOPRAZOLE SODIUM 40 MILLIGRAM(S): 20 TABLET, DELAYED RELEASE ORAL at 13:19

## 2023-12-04 RX ADMIN — Medication 20 MILLIEQUIVALENT(S): at 16:12

## 2023-12-04 RX ADMIN — LIDOCAINE 1 PATCH: 4 CREAM TOPICAL at 19:34

## 2023-12-04 RX ADMIN — HEPARIN SODIUM 5000 UNIT(S): 5000 INJECTION INTRAVENOUS; SUBCUTANEOUS at 07:00

## 2023-12-04 RX ADMIN — Medication 975 MILLIGRAM(S): at 21:30

## 2023-12-04 RX ADMIN — LIDOCAINE 1 PATCH: 4 CREAM TOPICAL at 13:19

## 2023-12-04 RX ADMIN — Medication 975 MILLIGRAM(S): at 20:36

## 2023-12-04 NOTE — PROGRESS NOTE ADULT - ASSESSMENT
BOBO DODSON is a 62-year-old M, Mandarin speaking, nonsmoker, with PMHx of myasthenia gravis, s/p right video-assisted thoracoscopic surgery for thymectomy in 2018. As per patient, post-op was complicated with respiratory failure s/p tracheotomy and PEG placement. He is referred by GI, Dr. Elke Bang for an initial evaluation of newly diagnosed gastric adenocarcinoma at [facility]. Pt was referred to advance GI, Dr. Byrd for staging. Based on the EUS, it is an early-stage gastric cancer and surgical gastrectomy is the recommendation. Pt underwent an elective robotic assisted distal gastrectomy with Pato en Y reconstruction on 12/1/23.  POD3    - 9LA for hemodynamic monitoring   - Neuro consult appreciated rec: MG  - Continue Cellcept 1000mg IV QD while NPO (home med: mycophenolate mofetil 250 mg oral capsule: 4 cap(s) orally once a day)   - Please avoid magnesium sulfate in MG patients  - Please avoid medications that could potentially exacerbate MG (such as some antibiotics, statins, and propanolol to name a few)  - taperred off O2 supplement   - NPO/plan for esophagram today on Monday 12/4   - plan to start CLD if passed esophagram and d/c home later today   - IVF with lactated ringers. 1000 milliLiter(s) IV Continuous <Continuous>  - postop iv abx: ceFAZolin   IVPB 2000 milliGRAM(s) IV Intermittent every 8 hours x3 only per protocol  - pain management   lidocaine   4% Patch 1 Patch Transdermal daily  acetaminophen   IVPB .. 1000 milliGRAM(s) IV Intermittent once PRN  ketorolac   Injectable 15 milliGRAM(s) IV Push every 4 hours PRN  HYDROmorphone  Injectable 0.5 milliGRAM(s) IV Push every 4 hours PRN  - GI ppx: pantoprazole  Injectable 40 milliGRAM(s) IV Push daily  - DVT ppx: heparin   Injectable 5000 Unit(s) SubCutaneous every 8 hours  - held oral home meds while NPO (mycophenolate mofetil 250 mg oral capsule: 4 cap(s) orally once a day (01 Dec 2023 06:40)     REF/GASTRO: DR. ELKE BANG   P:[phone]    PCP:  AMANDA [facility]  [phone]    Disposition: medically stable, awaiting esophagram today and to start CLD if passed the test. Likely discharge home later today. Pt is asking a copy of proof of surgery ( i.e. DC summary ok) as related to the CTS team  F/u CTS Dr. Shaka Fitzgerald in 1-2 weeks   F/u PMD i n1-2 weeks     Thank you for the consult, will continue follow pt with you.    BOBO DODSON is a 62-year-old M, Mandarin speaking, nonsmoker, with PMHx of myasthenia gravis, s/p right video-assisted thoracoscopic surgery for thymectomy in 2018. As per patient, post-op was complicated with respiratory failure s/p tracheotomy and PEG placement. He is referred by GI, Dr. Elke Bang for an initial evaluation of newly diagnosed gastric adenocarcinoma at [facility]. Pt was referred to advance GI, Dr. Byrd for staging. Based on the EUS, it is an early-stage gastric cancer and surgical gastrectomy is the recommendation. Pt underwent an elective robotic assisted distal gastrectomy with Pato en Y reconstruction on 12/1/23.  POD3    - 9LA for hemodynamic monitoring   - Neuro consult appreciated rec: MG  - Continue Cellcept 1000mg IV QD while NPO (home med: mycophenolate mofetil 250 mg oral capsule: 4 cap(s) orally once a day)   - Please avoid magnesium sulfate in MG patients  - Please avoid medications that could potentially exacerbate MG (such as some antibiotics, statins, and propanolol to name a few)  - taperred off O2 supplement   - NPO/plan for esophagram today on Monday 12/4   - plan to start CLD if passed esophagram and d/c home later today   - IVF with lactated ringers. 1000 milliLiter(s) IV Continuous <Continuous>  - postop iv abx: ceFAZolin   IVPB 2000 milliGRAM(s) IV Intermittent every 8 hours x3 only per protocol  - pain management   lidocaine   4% Patch 1 Patch Transdermal daily  acetaminophen   IVPB .. 1000 milliGRAM(s) IV Intermittent once PRN  ketorolac   Injectable 15 milliGRAM(s) IV Push every 4 hours PRN  HYDROmorphone  Injectable 0.5 milliGRAM(s) IV Push every 4 hours PRN  - GI ppx: pantoprazole  Injectable 40 milliGRAM(s) IV Push daily  - DVT ppx: heparin   Injectable 5000 Unit(s) SubCutaneous every 8 hours  - held oral home meds while NPO (mycophenolate mofetil 250 mg oral capsule: 4 cap(s) orally once a day (01 Dec 2023 06:40)     REF/GASTRO: DR. ELKE BANG   P:[phone]    PCP:  AMANDA [facility]  [phone]    Disposition: medically stable, awaiting esophagram today and to start CLD if passed the test. Likely discharge home later today. Pt is asking a copy of proof of surgery ( i.e. DC summary ok) as related to the CTS team  F/u CTS Dr. Shaka Fitzgerald in 1-2 weeks   F/u PMD i n1-2 weeks     Thank you for the consult, will continue follow pt with you.

## 2023-12-04 NOTE — PROGRESS NOTE ADULT - SUBJECTIVE AND OBJECTIVE BOX
Patient discussed on morning rounds with Dr. Matt     Operation / Date:  12/1/2023, EGD, Laparoscopic Robot-Assisted Gastrectomy and Pato-en-Y Reconstruction    SUBJECTIVE ASSESSMENT:  63y Male complaining of mild incisional pain over RLQ incision. No other acute complaints. Voiding well, having BMs. Using IS well. Ambulating in hallways   Denies any fevers, chills, headache, lightheadedness, dizziness, chest pain, shortness of breath, abdominal pain, nausea, vomiting, changes in bowel or bladder, paresthesias, or any other acute complaint.        Vital Signs Last 24 Hrs  T(C): 36.6 (04 Dec 2023 15:59), Max: 36.8 (04 Dec 2023 13:41)  T(F): 97.8 (04 Dec 2023 15:59), Max: 98.3 (04 Dec 2023 13:41)  HR: 87 (04 Dec 2023 13:04) (70 - 91)  BP: 105/73 (04 Dec 2023 13:04) (94/59 - 132/77)  BP(mean): 84 (04 Dec 2023 13:04) (68 - 97)  RR: 18 (04 Dec 2023 13:04) (16 - 18)  SpO2: 95% (04 Dec 2023 13:04) (94% - 100%)    Parameters below as of 04 Dec 2023 13:04  Patient On (Oxygen Delivery Method): room air      I&O's Detail    03 Dec 2023 07:01  -  04 Dec 2023 07:00  --------------------------------------------------------  IN:    dextrose 5% + sodium chloride 0.9% w/ Additives: 720 mL  Total IN: 720 mL    OUT:    Oral Fluid: 0 mL    Voided (mL): 750 mL  Total OUT: 750 mL    Total NET: -30 mL      04 Dec 2023 07:01  -  04 Dec 2023 16:11  --------------------------------------------------------  IN:    dextrose 5% + sodium chloride 0.9% w/ Additives: 420 mL    Oral Fluid: 240 mL  Total IN: 660 mL    OUT:  Total OUT: 0 mL    Total NET: 660 mL        CHEST TUBE:  NO    JENNA DRAIN:  NO   EPICARDIAL WIRES: NO   TIE DOWNS: NO   PAUL: NO     PHYSICAL EXAM:    General: Sitting in chair comfortably in NAD  Neuro: A&Ox3, no focal deficits   HEENT: NCAT, EOMI   Cardiac: Regular rate and rhythm, normal S1 and S2. No m/r/g   Pulm: Breathing comfortably on RA. No signs of respiratory distress. Lungs are CTA b/l without wheezes, rales, or rhonchi   Abdomen: Soft, mildly distended. Non-tender throughout. No rebound, peritonitis, or guarding   : No paul  Extremities: Warm and well perfused, no peripheral edema,. No calf tenderness. SCDs and TEDs in place  MSK: Full AROM   Wound: Laparoscopic incision sites are healing well. No erythema or drainage appreciated      LABS:                        13.2   7.29  )-----------( 183      ( 04 Dec 2023 06:09 )             40.0         12-04    138  |  107  |  12  ----------------------------<  86  3.8   |  24  |  0.67    Ca    7.9<L>      04 Dec 2023 06:09  Mg     1.9     12-04        Urinalysis Basic - ( 04 Dec 2023 06:09 )    Color: x / Appearance: x / SG: x / pH: x  Gluc: 86 mg/dL / Ketone: x  / Bili: x / Urobili: x   Blood: x / Protein: x / Nitrite: x   Leuk Esterase: x / RBC: x / WBC x   Sq Epi: x / Non Sq Epi: x / Bacteria: x        MEDICATIONS  (STANDING):  dextrose 5% + sodium chloride 0.9% with potassium chloride 20 mEq/L 1000 milliLiter(s) (60 mL/Hr) IV Continuous <Continuous>  heparin   Injectable 5000 Unit(s) SubCutaneous every 8 hours  lidocaine   4% Patch 1 Patch Transdermal daily  mycophenolate mofetil IVPB 1000 milliGRAM(s) IV Intermittent daily  pantoprazole  Injectable 40 milliGRAM(s) IV Push daily  potassium chloride   Powder 20 milliEquivalent(s) Oral once    MEDICATIONS  (PRN):  acetaminophen   IVPB .. 1000 milliGRAM(s) IV Intermittent once PRN Temp greater or equal to 38C (100.4F), Mild Pain (1 - 3)  HYDROmorphone  Injectable 0.5 milliGRAM(s) IV Push every 4 hours PRN Severe Pain (7 - 10)  ketorolac   Injectable 15 milliGRAM(s) IV Push every 4 hours PRN Moderate Pain (4 - 6)        RADIOLOGY & ADDITIONAL TESTS:  Xray UGI Single Contrast Study (12.04.23 @ 11:36) >  IMPRESSION: No leak at the gastrojejunal anastomosis    < end of copied text >

## 2023-12-04 NOTE — PROGRESS NOTE ADULT - ASSESSMENT
63 YO Male w/ PMHx of myasthenia gravis (s/p R VATS thymectomy in 2018 c/b respiratory failure requiring trachoestomy and PEG placement) who was referred by Dr. Robert Bang for an initial evaluation of newly diagnosed gastric adenocarcinoma at [facility]. Pt was referred to Dr. Byrd for staging. Based on the EUS, it was determined to be early-stage gastric cancer and patient referred to Dr. Fitzgerald for surgical intervention. On 12/01/2 he underwent, EGD, laparoscopic robotic assisted gastrectomy and Pato en Y reconstruction. Patient recovered in PACU then 9LA. On POD 1, neurolopgy was consulted for management of myasthenia gravis. On POD 1, his paul catheter was removed and passed his TOV. On POD 2, patient was advanced to sips and chips, tolerating it well. On POD 3, patient had his UGI performed which demonstrated no anastomotic leak. Patient was advanced to a clear liquid diet, will follow-up toleration. Plan for d/c home tomorrow if patient tolerates diet well   Plan:    Neurovascular:   Myasthenia Gravis   - on Cellcept 1000 mg   - neuro following, appreciate recs   Pain is well controlled with PRN Tylenol Toradol, and Dilaudid   No delirium, no focal deficits     Cardiovascular:   Hemodynamically stable over last 24 hours   - HR 70-81  - BP: 119-132/77-78  Continue to monitor on telemetry     Respiratory:   -Oxygenating well on room air  -Encourage continued use of IS 10x/hr and frequent ambulation  -CXR: stable    GI:  POD 3 s/o EGD Laparoscopic RA gastrectomy and Pato en Y on  - UGI showed no leak   - patient advanced to clear liquid, follow-up toleration   - will change all IV medications to PO  -GI PPX: IV to PO protonix   -Bowel Regimen: had BM off bowel regimen, will consider adding one if patient complains of discomfort     Renal / :  BUN/Cr stable @ 12/0.67  Voiding well, 750 cc UOP   Monitor I/O's daily     Endocrine:    -No hx of DM or thyroid dx  -A1c: not obtained  -TSH: not obtained    Hematologic:  H/h stable @ 13.2/40.0  DVT PPx: SQH 5000 U     ID:  Afebrile, WBC 7.2  -Continue to observe for SIRS/Sepsis Syndrome.    Disposition:  Plan for discharge home tomorrow  61 YO Male w/ PMHx of myasthenia gravis (s/p R VATS thymectomy in 2018 c/b respiratory failure requiring trachoestomy and PEG placement) who was referred by Dr. Robert Bang for an initial evaluation of newly diagnosed gastric adenocarcinoma at [facility]. Pt was referred to Dr. Byrd for staging. Based on the EUS, it was determined to be early-stage gastric cancer and patient referred to Dr. Fitzgerald for surgical intervention. On 12/01/2 he underwent, EGD, laparoscopic robotic assisted gastrectomy and Pato en Y reconstruction. Patient recovered in PACU then 9LA. On POD 1, neurolopgy was consulted for management of myasthenia gravis. On POD 1, his paul catheter was removed and passed his TOV. On POD 2, patient was advanced to sips and chips, tolerating it well. On POD 3, patient had his UGI performed which demonstrated no anastomotic leak. Patient was advanced to a clear liquid diet, will follow-up toleration. Plan for d/c home tomorrow if patient tolerates diet well   Plan:    Neurovascular:   Myasthenia Gravis   - on Cellcept 1000 mg   - neuro following, appreciate recs   Pain is well controlled with PRN Tylenol Toradol, and Dilaudid   No delirium, no focal deficits     Cardiovascular:   Hemodynamically stable over last 24 hours   - HR 70-81  - BP: 119-132/77-78  Continue to monitor on telemetry     Respiratory:   -Oxygenating well on room air  -Encourage continued use of IS 10x/hr and frequent ambulation  -CXR: stable    GI:  POD 3 s/o EGD Laparoscopic RA gastrectomy and Pato en Y on  - UGI showed no leak   - patient advanced to clear liquid, follow-up toleration   - will change all IV medications to PO  -GI PPX: IV to PO protonix   -Bowel Regimen: had BM off bowel regimen, will consider adding one if patient complains of discomfort     Renal / :  BUN/Cr stable @ 12/0.67  Voiding well, 750 cc UOP   Monitor I/O's daily     Endocrine:    -No hx of DM or thyroid dx  -A1c: not obtained  -TSH: not obtained    Hematologic:  H/h stable @ 13.2/40.0  DVT PPx: SQH 5000 U     ID:  Afebrile, WBC 7.2  -Continue to observe for SIRS/Sepsis Syndrome.    Disposition:  Plan for discharge home tomorrow

## 2023-12-04 NOTE — PROGRESS NOTE ADULT - SUBJECTIVE AND OBJECTIVE BOX
Patient is a 63y old  Male who presents with a chief complaint of elective surgery: EGD, laparoscopic robotic assisted gastrectomy and reconstruction with possible J-tube placement (03 Dec 2023 09:50)    INTERVAL EVENTS: NAEON     SUBJECTIVE:  Patient was seen and examined at bedside w. CTS team. Pt reports mild superficial incision pain. Denies SOB, CP, N/V,etc.     Review of systems: No fever, chills, dizziness, HA, Changes in vision, CP, dyspnea, nausea or vomiting, dysuria, changes in bowel movements, LE edema. Rest of 12 point Review of systems negative unless otherwise documented elsewhere in note.     Diet, NPO:   With Ice Chips/Sips of Water (12-03-23 @ 09:16) [Active]      MEDICATIONS:  MEDICATIONS  (STANDING):  dextrose 5% + sodium chloride 0.9% with potassium chloride 20 mEq/L 1000 milliLiter(s) (60 mL/Hr) IV Continuous <Continuous>  heparin   Injectable 5000 Unit(s) SubCutaneous every 8 hours  lidocaine   4% Patch 1 Patch Transdermal daily  mycophenolate mofetil IVPB 1000 milliGRAM(s) IV Intermittent daily  pantoprazole  Injectable 40 milliGRAM(s) IV Push daily    MEDICATIONS  (PRN):  acetaminophen   IVPB .. 1000 milliGRAM(s) IV Intermittent once PRN Temp greater or equal to 38C (100.4F), Mild Pain (1 - 3)  HYDROmorphone  Injectable 0.5 milliGRAM(s) IV Push every 4 hours PRN Severe Pain (7 - 10)  ketorolac   Injectable 15 milliGRAM(s) IV Push every 4 hours PRN Moderate Pain (4 - 6)      Allergies    No Known Allergies    Intolerances        OBJECTIVE:  Vital Signs Last 24 Hrs  T(C): 36.6 (04 Dec 2023 08:40), Max: 36.8 (03 Dec 2023 13:14)  T(F): 97.8 (04 Dec 2023 08:40), Max: 98.3 (03 Dec 2023 13:14)  HR: 91 (04 Dec 2023 08:52) (70 - 91)  BP: 94/59 (04 Dec 2023 08:52) (94/59 - 132/77)  BP(mean): 68 (04 Dec 2023 08:52) (68 - 97)  RR: 17 (04 Dec 2023 08:52) (16 - 17)  SpO2: 94% (04 Dec 2023 08:52) (94% - 100%)    Parameters below as of 04 Dec 2023 08:52  Patient On (Oxygen Delivery Method): room air      I&O's Summary    03 Dec 2023 07:01  -  04 Dec 2023 07:00  --------------------------------------------------------  IN: 720 mL / OUT: 750 mL / NET: -30 mL    04 Dec 2023 07:01  -  04 Dec 2023 11:53  --------------------------------------------------------  IN: 120 mL / OUT: 0 mL / NET: 120 mL        PHYSICAL EXAM:  Gen: Reclining in bed at time of exam, appears stated age  HEENT: NCAT, MMM, clear OP  Neck: supple, trachea at midline  CV: RRR, +S1/S2  Pulm: adequate respiratory effort, no increase in work of breathing  Abd: soft, NTND, lap incision clean  Skin: warm and dry,   Ext: WWP, no LE edema  Neuro: AOx3, no gross focal neurological deficits  Psych: affect and behavior appropriate, pleasant at time of interview  :     LABS:                        13.2   7.29  )-----------( 183      ( 04 Dec 2023 06:09 )             40.0     12-04    138  |  107  |  12  ----------------------------<  86  3.8   |  24  |  0.67    Ca    7.9<L>      04 Dec 2023 06:09  Mg     1.9     12-04          CAPILLARY BLOOD GLUCOSE        Urinalysis Basic - ( 04 Dec 2023 06:09 )    Color: x / Appearance: x / SG: x / pH: x  Gluc: 86 mg/dL / Ketone: x  / Bili: x / Urobili: x   Blood: x / Protein: x / Nitrite: x   Leuk Esterase: x / RBC: x / WBC x   Sq Epi: x / Non Sq Epi: x / Bacteria: x        MICRODATA:      RADIOLOGY/OTHER STUDIES:

## 2023-12-05 ENCOUNTER — TRANSCRIPTION ENCOUNTER (OUTPATIENT)
Age: 63
End: 2023-12-05

## 2023-12-05 VITALS — TEMPERATURE: 97 F

## 2023-12-05 LAB
ANION GAP SERPL CALC-SCNC: 7 MMOL/L — SIGNIFICANT CHANGE UP (ref 5–17)
ANION GAP SERPL CALC-SCNC: 7 MMOL/L — SIGNIFICANT CHANGE UP (ref 5–17)
BUN SERPL-MCNC: 12 MG/DL — SIGNIFICANT CHANGE UP (ref 7–23)
BUN SERPL-MCNC: 12 MG/DL — SIGNIFICANT CHANGE UP (ref 7–23)
CALCIUM SERPL-MCNC: 8.2 MG/DL — LOW (ref 8.4–10.5)
CALCIUM SERPL-MCNC: 8.2 MG/DL — LOW (ref 8.4–10.5)
CHLORIDE SERPL-SCNC: 108 MMOL/L — SIGNIFICANT CHANGE UP (ref 96–108)
CHLORIDE SERPL-SCNC: 108 MMOL/L — SIGNIFICANT CHANGE UP (ref 96–108)
CO2 SERPL-SCNC: 25 MMOL/L — SIGNIFICANT CHANGE UP (ref 22–31)
CO2 SERPL-SCNC: 25 MMOL/L — SIGNIFICANT CHANGE UP (ref 22–31)
CREAT SERPL-MCNC: 0.71 MG/DL — SIGNIFICANT CHANGE UP (ref 0.5–1.3)
CREAT SERPL-MCNC: 0.71 MG/DL — SIGNIFICANT CHANGE UP (ref 0.5–1.3)
EGFR: 102 ML/MIN/1.73M2 — SIGNIFICANT CHANGE UP
EGFR: 102 ML/MIN/1.73M2 — SIGNIFICANT CHANGE UP
GLUCOSE SERPL-MCNC: 106 MG/DL — HIGH (ref 70–99)
GLUCOSE SERPL-MCNC: 106 MG/DL — HIGH (ref 70–99)
HCT VFR BLD CALC: 38.9 % — LOW (ref 39–50)
HCT VFR BLD CALC: 38.9 % — LOW (ref 39–50)
HGB BLD-MCNC: 13.2 G/DL — SIGNIFICANT CHANGE UP (ref 13–17)
HGB BLD-MCNC: 13.2 G/DL — SIGNIFICANT CHANGE UP (ref 13–17)
MAGNESIUM SERPL-MCNC: 1.8 MG/DL — SIGNIFICANT CHANGE UP (ref 1.6–2.6)
MAGNESIUM SERPL-MCNC: 1.8 MG/DL — SIGNIFICANT CHANGE UP (ref 1.6–2.6)
MCHC RBC-ENTMCNC: 31.8 PG — SIGNIFICANT CHANGE UP (ref 27–34)
MCHC RBC-ENTMCNC: 31.8 PG — SIGNIFICANT CHANGE UP (ref 27–34)
MCHC RBC-ENTMCNC: 33.9 GM/DL — SIGNIFICANT CHANGE UP (ref 32–36)
MCHC RBC-ENTMCNC: 33.9 GM/DL — SIGNIFICANT CHANGE UP (ref 32–36)
MCV RBC AUTO: 93.7 FL — SIGNIFICANT CHANGE UP (ref 80–100)
MCV RBC AUTO: 93.7 FL — SIGNIFICANT CHANGE UP (ref 80–100)
NRBC # BLD: 0 /100 WBCS — SIGNIFICANT CHANGE UP (ref 0–0)
NRBC # BLD: 0 /100 WBCS — SIGNIFICANT CHANGE UP (ref 0–0)
PLATELET # BLD AUTO: 178 K/UL — SIGNIFICANT CHANGE UP (ref 150–400)
PLATELET # BLD AUTO: 178 K/UL — SIGNIFICANT CHANGE UP (ref 150–400)
POTASSIUM SERPL-MCNC: 3.9 MMOL/L — SIGNIFICANT CHANGE UP (ref 3.5–5.3)
POTASSIUM SERPL-MCNC: 3.9 MMOL/L — SIGNIFICANT CHANGE UP (ref 3.5–5.3)
POTASSIUM SERPL-SCNC: 3.9 MMOL/L — SIGNIFICANT CHANGE UP (ref 3.5–5.3)
POTASSIUM SERPL-SCNC: 3.9 MMOL/L — SIGNIFICANT CHANGE UP (ref 3.5–5.3)
RBC # BLD: 4.15 M/UL — LOW (ref 4.2–5.8)
RBC # BLD: 4.15 M/UL — LOW (ref 4.2–5.8)
RBC # FLD: 12.1 % — SIGNIFICANT CHANGE UP (ref 10.3–14.5)
RBC # FLD: 12.1 % — SIGNIFICANT CHANGE UP (ref 10.3–14.5)
SODIUM SERPL-SCNC: 140 MMOL/L — SIGNIFICANT CHANGE UP (ref 135–145)
SODIUM SERPL-SCNC: 140 MMOL/L — SIGNIFICANT CHANGE UP (ref 135–145)
WBC # BLD: 7.08 K/UL — SIGNIFICANT CHANGE UP (ref 3.8–10.5)
WBC # BLD: 7.08 K/UL — SIGNIFICANT CHANGE UP (ref 3.8–10.5)
WBC # FLD AUTO: 7.08 K/UL — SIGNIFICANT CHANGE UP (ref 3.8–10.5)
WBC # FLD AUTO: 7.08 K/UL — SIGNIFICANT CHANGE UP (ref 3.8–10.5)

## 2023-12-05 PROCEDURE — 88331 PATH CONSLTJ SURG 1 BLK 1SPC: CPT

## 2023-12-05 PROCEDURE — 88305 TISSUE EXAM BY PATHOLOGIST: CPT

## 2023-12-05 PROCEDURE — 83735 ASSAY OF MAGNESIUM: CPT

## 2023-12-05 PROCEDURE — 80048 BASIC METABOLIC PNL TOTAL CA: CPT

## 2023-12-05 PROCEDURE — 99232 SBSQ HOSP IP/OBS MODERATE 35: CPT

## 2023-12-05 PROCEDURE — 86850 RBC ANTIBODY SCREEN: CPT

## 2023-12-05 PROCEDURE — C9399: CPT

## 2023-12-05 PROCEDURE — 85610 PROTHROMBIN TIME: CPT

## 2023-12-05 PROCEDURE — 74240 X-RAY XM UPR GI TRC 1CNTRST: CPT

## 2023-12-05 PROCEDURE — 85730 THROMBOPLASTIN TIME PARTIAL: CPT

## 2023-12-05 PROCEDURE — C1889: CPT

## 2023-12-05 PROCEDURE — 36415 COLL VENOUS BLD VENIPUNCTURE: CPT

## 2023-12-05 PROCEDURE — S2900: CPT

## 2023-12-05 PROCEDURE — 86901 BLOOD TYPING SEROLOGIC RH(D): CPT

## 2023-12-05 PROCEDURE — 71045 X-RAY EXAM CHEST 1 VIEW: CPT

## 2023-12-05 PROCEDURE — 86900 BLOOD TYPING SEROLOGIC ABO: CPT

## 2023-12-05 PROCEDURE — 85027 COMPLETE CBC AUTOMATED: CPT

## 2023-12-05 PROCEDURE — 88309 TISSUE EXAM BY PATHOLOGIST: CPT

## 2023-12-05 PROCEDURE — 85025 COMPLETE CBC W/AUTO DIFF WBC: CPT

## 2023-12-05 RX ORDER — ACETAMINOPHEN 500 MG
3 TABLET ORAL
Qty: 168 | Refills: 0
Start: 2023-12-05 | End: 2023-12-18

## 2023-12-05 RX ORDER — SUCRALFATE 1 G
1 TABLET ORAL
Refills: 0 | DISCHARGE

## 2023-12-05 RX ORDER — OXYCODONE HYDROCHLORIDE 5 MG/1
1 TABLET ORAL
Qty: 20 | Refills: 0
Start: 2023-12-05 | End: 2023-12-09

## 2023-12-05 RX ADMIN — HEPARIN SODIUM 5000 UNIT(S): 5000 INJECTION INTRAVENOUS; SUBCUTANEOUS at 07:03

## 2023-12-05 RX ADMIN — PANTOPRAZOLE SODIUM 40 MILLIGRAM(S): 20 TABLET, DELAYED RELEASE ORAL at 06:35

## 2023-12-05 RX ADMIN — LIDOCAINE 1 PATCH: 4 CREAM TOPICAL at 00:29

## 2023-12-05 RX ADMIN — HEPARIN SODIUM 5000 UNIT(S): 5000 INJECTION INTRAVENOUS; SUBCUTANEOUS at 00:14

## 2023-12-05 NOTE — CHART NOTE - NSCHARTNOTEFT_GEN_A_CORE
63-year-old male who is a nonsmoker with PMHx of myasthenia gravis s/p right video-assisted thoracoscopic surgery for thymectomy in 2018 c/b respiratory failure that required tracheostomy and PEG (now reversed), and maintained on cellcept. Admitted for new adenocarcinoma and underwent partial gastrectomy on 12/1/23.   Neurology consulted for hx of myasthenia gravis and management.     Recs:   - Continue Cellcept 1000mg IV QD  - Please avoid magnesium sulfate in MG patients  - Please avoid medications that could potentially exacerbate MG (such as some antibiotics, statins, and propanolol to name a few)    Neurology will sign off. Call for new or worsening neuro status.
Patient seen and examined. Plan for dc home on clears/soft diet.
Patient seen and examined. Doing well. MOSES Swenson. UGI on Monday.
Patient seen and examined. Plan for swallow today.

## 2023-12-05 NOTE — DISCHARGE NOTE PROVIDER - PROVIDER TOKENS
FREE:[LAST:[Amilcar],FIRST:[Shaka],PHONE:[phone]],FAX:[(   )    -],ADDRESS:[[address]]],PROVIDER:[TOKEN:[[id number]]] FREE:[LAST:[Amilcar],FIRST:[Shaka],PHONE:[phone]],FAX:[(   )    -],ADDRESS:[[address]]],PROVIDER:[TOKEN:[[id number]]]

## 2023-12-05 NOTE — DISCHARGE NOTE NURSING/CASE MANAGEMENT/SOCIAL WORK - NSDCFUADDAPPT_GEN_ALL_CORE_FT
If you do not hear from our office about your follow up appointments please call [phone] to schedule. If you do not hear from our office about your follow up appointments please call [phone] to schedule.

## 2023-12-05 NOTE — PROGRESS NOTE ADULT - SUBJECTIVE AND OBJECTIVE BOX
Patient is a 63y old  Male who presents with a chief complaint of elective surgery: EGD, laparoscopic robotic assisted gastrectomy and reconstruction with possible J-tube placement (05 Dec 2023 12:47)    INTERVAL EVENTS: NAEON     SUBJECTIVE:  Patient was seen and examined at bedside. Tolerating CLD, in good spirits     Review of systems: No fever, chills, dizziness, HA, Changes in vision, CP, dyspnea, nausea or vomiting, dysuria, changes in bowel movements, LE edema. Rest of 12 point Review of systems negative unless otherwise documented elsewhere in note.     MEDICATIONS:  MEDICATIONS  (STANDING):  heparin   Injectable 5000 Unit(s) SubCutaneous every 8 hours  lidocaine   4% Patch 1 Patch Transdermal daily  mycophenolate mofetil 1000 milliGRAM(s) Oral daily  pantoprazole    Tablet 40 milliGRAM(s) Oral before breakfast    MEDICATIONS  (PRN):  acetaminophen     Tablet .. 975 milliGRAM(s) Oral every 6 hours PRN Mild Pain (1 - 3)  oxyCODONE    IR 10 milliGRAM(s) Oral every 6 hours PRN Severe Pain (7 - 10)  oxyCODONE    IR 5 milliGRAM(s) Oral every 6 hours PRN Moderate Pain (4 - 6)      Allergies    No Known Allergies    Intolerances        OBJECTIVE:  Vital Signs Last 24 Hrs  T(C): 36.3 (05 Dec 2023 13:15), Max: 36.8 (04 Dec 2023 13:41)  T(F): 97.3 (05 Dec 2023 13:15), Max: 98.3 (04 Dec 2023 13:41)  HR: 85 (05 Dec 2023 12:45) (70 - 121)  BP: 98/59 (05 Dec 2023 12:45) (97/61 - 116/75)  BP(mean): 72 (05 Dec 2023 12:45) (72 - 89)  RR: 16 (05 Dec 2023 12:45) (16 - 18)  SpO2: 92% (05 Dec 2023 12:45) (92% - 96%)    Parameters below as of 05 Dec 2023 12:45  Patient On (Oxygen Delivery Method): room air      I&O's Summary    04 Dec 2023 07:01  -  05 Dec 2023 07:00  --------------------------------------------------------  IN: 1620 mL / OUT: 200 mL / NET: 1420 mL        PHYSICAL EXAM:  Gen: Reclining in bed at time of exam, appears stated age  HEENT: NCAT, MMM, clear OP  Neck: supple, trachea at midline  CV: RRR, +S1/S2  Pulm: adequate respiratory effort, no increase in work of breathing  Abd: soft, NTND  Skin: warm and dry,   Ext: WWP, no LE edema  Neuro: AOx3, no gross focal neurological deficits  Psych: affect and behavior appropriate, pleasant at time of interview  :     LABS:                        13.2   7.08  )-----------( 178      ( 05 Dec 2023 07:09 )             38.9     12-05    140  |  108  |  12  ----------------------------<  106<H>  3.9   |  25  |  0.71    Ca    8.2<L>      05 Dec 2023 07:09  Mg     1.8     12-05          CAPILLARY BLOOD GLUCOSE        Urinalysis Basic - ( 05 Dec 2023 07:09 )    Color: x / Appearance: x / SG: x / pH: x  Gluc: 106 mg/dL / Ketone: x  / Bili: x / Urobili: x   Blood: x / Protein: x / Nitrite: x   Leuk Esterase: x / RBC: x / WBC x   Sq Epi: x / Non Sq Epi: x / Bacteria: x        MICRODATA:      RADIOLOGY/OTHER STUDIES:

## 2023-12-05 NOTE — DISCHARGE NOTE PROVIDER - NSDCCPTREATMENT_GEN_ALL_CORE_FT
PRINCIPAL PROCEDURE  Procedure: Distal partial gastrectomy  Findings and Treatment: Robotic assisted, with Pato en Y reconstruction.

## 2023-12-05 NOTE — DISCHARGE NOTE PROVIDER - HOSPITAL COURSE
Patient discussed on morning rounds with Dr. Matt    Operation Date: 12/1/23 EGD, laparoscopic, RA gastrectomy and pato en Y reconstruction    Primary Surgeon/Attending MD: Dr. Fitzgerald    Referring Physician: Dr. Robert Bang  _ _ _ _ _ _ _ _ _ _ _ _  HOSPITAL COURSE:    61 YO Male w/ PMHx of myasthenia gravis (s/p R VATS thymectomy in 2018 c/b respiratory failure requiring trachoestomy and PEG placement) who was referred by Dr. Robert Bang for an initial evaluation of newly diagnosed gastric adenocarcinoma at [facility]. Pt was referred to Dr. Byrd for staging. Based on the EUS, it was determined to be early-stage gastric cancer and patient referred to Dr. Fitzgerald for surgical intervention. On 12/01/2 he underwent EGD, laparoscopic robotic assisted gastrectomy and Pato en Y reconstruction. Patient recovered in PACU then 9LA. On POD 1, neurology was consulted for management of myasthenia gravis and medications were adjusted per recommendations. On POD 1, his paul catheter was removed and passed his TOV. On POD 2, patient was advanced to sips and chips. On POD 3 patient had his UGI performed which demonstrated no anastomotic leak. Patient was advanced to a clear liquid diet. POD4 diet advanced to soft and as per Dr. Matt patient was stable and ready for discharge. At time of discharge patient was ambulating independently on RA, having bowel movements, and felt ready for discharge home.   _ _ _ _ _ _ _ _ _ _ _ _  Physical Exam  CONSTITUTIONAL: Well appearing in NAD assessed laying comfortably in bed   NEURO: A&OX3. No focal deficits noted, moving bilateral upper and lower extremities                    CV: RRR, no murmurs, rubs, gallops  RESPIRATORY: Clear to auscultation bilateral posterior lung fields, no wheezes, rales, rhonchi   GI: +BS, NT/ND  MUSKULOSKELETAL: No peripheral edema or calf tenderness. Full strength and ROM bilateral upper and lower extremities   VASCULAR: Bilateral distal pulses 2+  INCISIONS: Abdominal laparoscopy incisions clean, dry, intact     _ _ _ _ _ _ _ _ _ _ _ _  RELEVANT LABS/IMAGING:    < from: Xray UGI Single Contrast Study (12.04.23 @ 11:36) >    FINDINGS:  The lower lung fields are clear. Chain sutures noted over the bilateral   upper abdomen. Multiple air-filled mildly distended small bowel loops.    Limited upper GI series was performed using Gastrografin followed by   barium sulfate. Contrast passes without hindrance from the distal   esophagus into the gastric remnant, and then enters unremarkable loops of   small bowel. There is no evidenceof leakage at the gastrojejunal   anastomosis.    IMPRESSION: No leak at the gastrojejunal anastomosis.    < end of copied text >      _ _ _ _ _ _ _ _ _ _ _ _  Over 35 minutes was spent with the patient reviewing the discharge material including medications, follow up appointments, recovery, concerning symptoms, and how to contact their health care providers if they have questions   Patient discussed on morning rounds with Dr. Matt    Operation Date: 12/1/23 EGD, laparoscopic, RA gastrectomy and pato en Y reconstruction    Primary Surgeon/Attending MD: Dr. Fitzgerald    Referring Physician: Dr. Robert Bang  _ _ _ _ _ _ _ _ _ _ _ _  HOSPITAL COURSE:    61 YO Male w/ PMHx of myasthenia gravis (s/p R VATS thymectomy in 2018 c/b respiratory failure requiring trachoestomy and PEG placement) who was referred by Dr. Robert Bang for an initial evaluation of newly diagnosed gastric adenocarcinoma at [facility]. Pt was referred to Dr. Byrd for staging. Based on the EUS, it was determined to be early-stage gastric cancer and patient referred to Dr. Fitzgerald for surgical intervention. On 12/01/2 he underwent EGD, laparoscopic robotic assisted gastrectomy and Pato en Y reconstruction. Patient recovered in PACU then 9LA. On POD 1, neurology was consulted for management of myasthenia gravis and medications were adjusted per recommendations. On POD 1, his paul catheter was removed and passed his TOV. On POD 2, patient was advanced to sips and chips. On POD 3 patient had his UGI performed which demonstrated no anastomotic leak. Patient was advanced to a clear liquid diet. POD4 diet advanced to soft and as per Dr. Matt patient was stable and ready for discharge. At time of discharge patient was ambulating independently on RA, having bowel movements, and felt ready for discharge home.   _ _ _ _ _ _ _ _ _ _ _ _  Physical Exam  CONSTITUTIONAL: Well appearing in NAD assessed laying comfortably in bed   NEURO: A&OX3. No focal deficits noted, moving bilateral upper and lower extremities                    CV: RRR, no murmurs, rubs, gallops  RESPIRATORY: Clear to auscultation bilateral posterior lung fields, no wheezes, rales, rhonchi   GI: +BS, NT/ND  MUSKULOSKELETAL: No peripheral edema or calf tenderness. Full strength and ROM bilateral upper and lower extremities   VASCULAR: Bilateral distal pulses 2+  INCISIONS: Abdominal laparoscopy incisions clean, dry, intact     _ _ _ _ _ _ _ _ _ _ _ _  RELEVANT LABS/IMAGING:    < from: Xray UGI Single Contrast Study (12.04.23 @ 11:36) >    FINDINGS:  The lower lung fields are clear. Chain sutures noted over the bilateral   upper abdomen. Multiple air-filled mildly distended small bowel loops.    Limited upper GI series was performed using Gastrografin followed by   barium sulfate. Contrast passes without hindrance from the distal   esophagus into the gastric remnant, and then enters unremarkable loops of   small bowel. There is no evidenceof leakage at the gastrojejunal   anastomosis.    IMPRESSION: No leak at the gastrojejunal anastomosis.    < end of copied text >      _ _ _ _ _ _ _ _ _ _ _ _  Over 35 minutes was spent with the patient reviewing the discharge material including medications, follow up appointments, recovery, concerning symptoms, and how to contact their health care providers if they have questions

## 2023-12-05 NOTE — PROGRESS NOTE ADULT - PROVIDER SPECIALTY LIST ADULT
Hospitalist
Hospitalist
Thoracic Surgery
Thoracic Surgery
Hospitalist
Thoracic Surgery
Neurology
Hospitalist
Thoracic Surgery

## 2023-12-05 NOTE — DISCHARGE NOTE NURSING/CASE MANAGEMENT/SOCIAL WORK - NSDCPEFALRISK_GEN_ALL_CORE
For information on Fall & Injury Prevention, visit: [email or web address] OR  [email or web address] OR  https://www.cdc.gov/steadi/patient.html For information on Fall & Injury Prevention, visit: [email or web address] OR  [email or web address] OR  https://www.cdc.gov/steadi/patient.html

## 2023-12-05 NOTE — PROGRESS NOTE ADULT - ASSESSMENT
BOBO DODSON is a 62-year-old M, Mandarin speaking, nonsmoker, with PMHx of myasthenia gravis, s/p right video-assisted thoracoscopic surgery for thymectomy in 2018. As per patient, post-op was complicated with respiratory failure s/p tracheotomy and PEG placement. He is referred by GI, Dr. Elke Bang for an initial evaluation of newly diagnosed gastric adenocarcinoma at [facility]. Pt was referred to advance GI, Dr. Byrd for staging. Based on the EUS, it is an early-stage gastric cancer and surgical gastrectomy is the recommendation. Pt underwent an elective robotic assisted distal gastrectomy with Pato en Y reconstruction on 12/1/23.  POD4    - 9LA for hemodynamic monitoring   - Neuro consult appreciated rec: MG  - resumed mycophenolate mofetil 250 mg oral capsule: 4 cap(s) orally once a day   - Please avoid magnesium sulfate in MG patients  - Please avoid medications that could potentially exacerbate MG (such as some antibiotics, statins, and propanolol to name a few)  - taperred off O2 supplement   - Swallowing study (12/4 ) no anastomotic leak  - tolerating CLD   - postop iv abx: ceFAZolin   IVPB 2000 milliGRAM(s) IV Intermittent every 8 hours x3 only per protocol  - pain management   lidocaine   4% Patch 1 Patch Transdermal daily  acetaminophen   IVPB .. 1000 milliGRAM(s) IV Intermittent once PRN  ketorolac   Injectable 15 milliGRAM(s) IV Push every 4 hours PRN  HYDROmorphone  Injectable 0.5 milliGRAM(s) IV Push every 4 hours PRN  - GI ppx: pantoprazole  Injectable 40 milliGRAM(s) IV Push daily  - DVT ppx: heparin   Injectable 5000 Unit(s) SubCutaneous every 8 hours  - held oral home meds while NPO (mycophenolate mofetil 250 mg oral capsule: 4 cap(s) orally once a day (01 Dec 2023 06:40)     REF/GASTRO: DR. ELKE BANG   P:[phone]    PCP:  AMANDA [facility]  [phone]    Disposition: medically stable for d/c home today 12/5    POC as d/w CTS [facility]     Thank you for the consult, will continue follow pt with you.    BOBO DODSON is a 62-year-old M, Mandarin speaking, nonsmoker, with PMHx of myasthenia gravis, s/p right video-assisted thoracoscopic surgery for thymectomy in 2018. As per patient, post-op was complicated with respiratory failure s/p tracheotomy and PEG placement. He is referred by GI, Dr. Elke Bang for an initial evaluation of newly diagnosed gastric adenocarcinoma at [facility]. Pt was referred to advance GI, Dr. Byrd for staging. Based on the EUS, it is an early-stage gastric cancer and surgical gastrectomy is the recommendation. Pt underwent an elective robotic assisted distal gastrectomy with Pato en Y reconstruction on 12/1/23.  POD4    - 9LA for hemodynamic monitoring   - Neuro consult appreciated rec: MG  - resumed mycophenolate mofetil 250 mg oral capsule: 4 cap(s) orally once a day   - Please avoid magnesium sulfate in MG patients  - Please avoid medications that could potentially exacerbate MG (such as some antibiotics, statins, and propanolol to name a few)  - taperred off O2 supplement   - Swallowing study (12/4 ) no anastomotic leak  - tolerating CLD   - postop iv abx: ceFAZolin   IVPB 2000 milliGRAM(s) IV Intermittent every 8 hours x3 only per protocol  - pain management   lidocaine   4% Patch 1 Patch Transdermal daily  acetaminophen   IVPB .. 1000 milliGRAM(s) IV Intermittent once PRN  ketorolac   Injectable 15 milliGRAM(s) IV Push every 4 hours PRN  HYDROmorphone  Injectable 0.5 milliGRAM(s) IV Push every 4 hours PRN  - GI ppx: pantoprazole  Injectable 40 milliGRAM(s) IV Push daily  - DVT ppx: heparin   Injectable 5000 Unit(s) SubCutaneous every 8 hours  - held oral home meds while NPO (mycophenolate mofetil 250 mg oral capsule: 4 cap(s) orally once a day (01 Dec 2023 06:40)     REF/GASTRO: DR. ELKE BANG   P:[phone]    PCP:  AMANDA [facility]  [phone]    Disposition: medically stable for d/c home today 12/5    POC as d/w CTS [facility] [employer] [facility]     Thank you for the consult, will continue follow pt with you.

## 2023-12-05 NOTE — DISCHARGE NOTE PROVIDER - NSDCMRMEDTOKEN_GEN_ALL_CORE_FT
acetaminophen 325 mg oral tablet: 3 tab(s) orally every 6 hours as needed for Mild Pain (1 - 3)  mycophenolate mofetil 250 mg oral capsule: 4 cap(s) orally once a day  omeprazole 40 mg oral delayed release capsule: 1 cap(s) orally once a day  oxyCODONE 5 mg oral tablet: 1 tab(s) orally every 6 hours as needed for Moderate Pain (4 - 6) MDD: 4 tabs  Vitamin B 6100mg: daily  Vitamin D3: daily

## 2023-12-05 NOTE — DISCHARGE NOTE PROVIDER - NSDCFUADDAPPT_GEN_ALL_CORE_FT
If you do not hear from our office about your follow up appointments please call [phone] to schedule. If you do not hear from our office about your follow up appointments please call [phone] to schedule.

## 2023-12-05 NOTE — DISCHARGE NOTE PROVIDER - CARE PROVIDER_API CALL
Shaka Fitzgerald  [address] [place] [address]  Phone: [phone]  Fax: (   )    -  Follow Up Time:     Robert Bang  Gastroenterology  [address] [facility], [address]  [place] [address]  Phone: [phone]  Fax: [phone]  Follow Up Time:    Shaka Fitzgerald  [address] [place] [address]  Phone: [phone]  Fax: (   )    -  Follow Up Time:     Robert Bang  Gastroenterology  [address] [facility], [address]  [place] [address]  Phone: [phone]  Fax: [phone]  Follow Up Time:

## 2023-12-05 NOTE — DISCHARGE NOTE NURSING/CASE MANAGEMENT/SOCIAL WORK - PATIENT PORTAL LINK FT
You can access the FollowMyHealth Patient Portal offered by [facility] by registering at the following website: [email or web address]. By joining [employer]’s FollowMyHealth portal, you will also be able to view your health information using other applications (apps) compatible with our system. You can access the FollowMyHealth Patient Portal offered by [facility] by registering at the following website: [email or web address]. By joining [employer]’s FollowMyHealth portal, you will also be able to view your health information using other applications (apps) compatible with our system.

## 2023-12-05 NOTE — DISCHARGE NOTE PROVIDER - NSDCFUADDINST_GEN_ALL_CORE_FT
- Please eat a soft diet as instructed until advanced to regular diet by your outpatient provider.    -Walk daily as tolerated and use your incentive spirometer 10 times every hour while you are awake.     -Please weigh yourself daily. If you notice over a 3 pound weight gain in 3 days, this is a sign you are likely retaining too much fluid. It is imperative you call our right away with unexplained rapid weight gain.      -Please continue to wear the compression stockings given to you in the hospital at home. This is a way to prevent fluid from building up in your legs.     -No driving or strenuous activity/exercise until cleared by your surgeon.    -Gently clean your incisions with unscented/antibacterial soap and water, pat dry.  You may leave them open to air.    -Call your doctor if you have shortness of breath, chest pain not relieved by pain medication, dizziness, fever >101.5, or increased redness or drainage from incisions.

## 2023-12-06 PROBLEM — C16.9 MALIGNANT NEOPLASM OF STOMACH, UNSPECIFIED: Chronic | Status: ACTIVE | Noted: 2023-11-30

## 2023-12-08 DIAGNOSIS — C16.9 MALIGNANT NEOPLASM OF STOMACH, UNSPECIFIED: ICD-10-CM

## 2023-12-08 DIAGNOSIS — G70.00 MYASTHENIA GRAVIS WITHOUT (ACUTE) EXACERBATION: ICD-10-CM

## 2023-12-12 RX ORDER — ELECTROLYTES/DEXTROSE
100 SOLUTION, ORAL ORAL
Refills: 0 | Status: ACTIVE | COMMUNITY

## 2023-12-12 RX ORDER — MYCOPHENOLATE MOFETIL 250 MG/1
250 CAPSULE ORAL DAILY
Refills: 0 | Status: ACTIVE | COMMUNITY

## 2023-12-12 RX ORDER — MYCOPHENOLATE MOFETIL 250 MG/1
250 CAPSULE ORAL TWICE DAILY
Refills: 0 | Status: DISCONTINUED | COMMUNITY
End: 2023-12-12

## 2023-12-14 LAB
SURGICAL PATHOLOGY STUDY: SIGNIFICANT CHANGE UP
SURGICAL PATHOLOGY STUDY: SIGNIFICANT CHANGE UP

## 2023-12-15 ENCOUNTER — APPOINTMENT (OUTPATIENT)
Dept: THORACIC SURGERY | Facility: CLINIC | Age: 63
End: 2023-12-15
Payer: MEDICAID

## 2023-12-15 ENCOUNTER — OUTPATIENT (OUTPATIENT)
Dept: OUTPATIENT SERVICES | Facility: HOSPITAL | Age: 63
LOS: 1 days | End: 2023-12-15
Payer: COMMERCIAL

## 2023-12-15 VITALS
BODY MASS INDEX: 23.79 KG/M2 | HEIGHT: 68 IN | RESPIRATION RATE: 16 BRPM | SYSTOLIC BLOOD PRESSURE: 98 MMHG | DIASTOLIC BLOOD PRESSURE: 65 MMHG | HEART RATE: 69 BPM | WEIGHT: 157 LBS | OXYGEN SATURATION: 97 % | TEMPERATURE: 97.7 F

## 2023-12-15 DIAGNOSIS — G70.00 MYASTHENIA GRAVIS WITHOUT (ACUTE) EXACERBATION: Chronic | ICD-10-CM

## 2023-12-15 DIAGNOSIS — Z93.1 GASTROSTOMY STATUS: Chronic | ICD-10-CM

## 2023-12-15 DIAGNOSIS — Z98.890 OTHER SPECIFIED POSTPROCEDURAL STATES: Chronic | ICD-10-CM

## 2023-12-15 PROCEDURE — 71046 X-RAY EXAM CHEST 2 VIEWS: CPT

## 2023-12-15 PROCEDURE — 71046 X-RAY EXAM CHEST 2 VIEWS: CPT | Mod: 26

## 2023-12-15 PROCEDURE — 99024 POSTOP FOLLOW-UP VISIT: CPT

## 2023-12-19 NOTE — COUNSELING
[Importance of Regular Medical Follow-Up] : the importance of regular medical follow-up [No Heavy Lifting] : no heavy lifting (>15-20 lb. for 1 month or 25 lb. for 3 months from date of surgery) [S/S of infection] : signs and symptoms of infection (and to whom it should be reported) [Progressive Ambulation/Activity] : progressive ambulation/activity [FreeTextEntry1] : advance to soft diet

## 2023-12-19 NOTE — ASSESSMENT
[FreeTextEntry1] : 62-year-old M, nonsmoker, with PMHx of myasthenia gravis (s/p RVATS thymectomy in 2018).  He was referred by Dr. Robert Bang for evaluation of newly diagnosed gastric adenocarcinoma at [facility], EUS staging T1BN0.   Patient had respiratory failure in 2018 s/p thymectomy requiring tracheostomy and PET tube placement.   On 12/01/2023, patient underwent EGD, Laparoscopy, robotic-assisted, Subtotal gastrectomy, Pato-en-Y gastrojejunal reconstruction, Abdominal lymph node dissection, Laparoscopic lysis of adhesions and take-down of the previous percutaneous endoscopic gastrostomy tube site.  He presents today for the first postoperative follow up.   Surgical pathology was reviewed with the patient and his family: pT1aN0, [facility] adenocarcinoma (Tubular), G2, moderately differentiated. All margins negative. Preop PET on 11/22/23 revealed no distant metastasis. Patient therefore has stage IA gastric adenocarcinoma. No adjuvant treatment is needed per NCCN guideline.    CXR completed today and was reviewed and without evidence of pneumothorax, pleural effusion or atelectasis.   Patient is doing well, weight is stable, surgical incisions without signs of infection. I suggested him advancing to soft diet, then regular diet in 2 weeks if tolerating soft foods. Should taking small portions 5-6 times per day.  Continue PPI to avoid acid reflux.  Will plan for a clinical check in three months. Patient was advised to contact us should any concerning symptoms arise. Patient verbally understood and agreed with the plan.  Plan: - RTO in three months. No CXR is needed.     I, Dr. Shaka Fitzgerald MD, personally performed the evaluation and management (E/M) services for this established patient who presents today with (a) new problem(s)/exacerbation of (an) existing condition(s).  That E/M includes conducting the clinically appropriate interval history &/or exam, assessing all new/exacerbated conditions, and establishing a new plan of care.  Today, my NP, Reny Stewart, was here to observe &/or participate in the visit & follow plan of care established by me.

## 2023-12-19 NOTE — REASON FOR VISIT
[de-identified] : 1.Esophagogastroduodenoscopy.  2.  Laparoscopy, robotic-assisted.  3.  Subtotal gastrectomy.  4.  Pato-en-Y gastrojejunal reconstruction.  5.  Abdominal lymph node dissection.  6.  Laparoscopic lysis of adhesions and take-down of the previous percutaneous endoscopic gastrostomy tube site. [de-identified] : 12/01/23 [de-identified] : 2 [de-identified] : Postop UGI on POD3 demonstrated no anastomotic leak. Patient was discharged on 12/05/2023 with soft diet and oxycodone.    Surgical pathology: pT1aN0, incisura adenocarcinoma (Tubular), G2, moderately differentiated. Tumor size 2.3cm. Tumor invades muscularis mucosae.  Lymphatic and/or Vascular invasion not identified. All margins negative. 16 LNs negative.   Patient presents today with a CXR for the 1st postoperative visit. Patient reports doing well in general. He is on pureed diet currently, with mild dysphagia. No nausea or vomiting, no coughs. Incisional pain is mild.    [Family Member] : family member

## 2023-12-19 NOTE — DISCUSSION/SUMMARY
[Doing Well] : is doing well [Excellent Pain Control] : has excellent pain control [2] : 2 [Clinical Recheck] : clinical recheck

## 2024-03-08 ENCOUNTER — APPOINTMENT (OUTPATIENT)
Dept: THORACIC SURGERY | Facility: CLINIC | Age: 64
End: 2024-03-08
Payer: MEDICAID

## 2024-03-08 VITALS
OXYGEN SATURATION: 96 % | TEMPERATURE: 96.7 F | WEIGHT: 150 LBS | HEART RATE: 64 BPM | DIASTOLIC BLOOD PRESSURE: 65 MMHG | HEIGHT: 68 IN | RESPIRATION RATE: 18 BRPM | BODY MASS INDEX: 22.73 KG/M2 | SYSTOLIC BLOOD PRESSURE: 108 MMHG

## 2024-03-08 DIAGNOSIS — C16.9 MALIGNANT NEOPLASM OF STOMACH, UNSPECIFIED: ICD-10-CM

## 2024-03-08 PROCEDURE — 99213 OFFICE O/P EST LOW 20 MIN: CPT

## 2024-03-11 PROBLEM — C16.9 GASTRIC ADENOCARCINOMA: Status: ACTIVE | Noted: 2023-11-08

## 2024-03-11 NOTE — CONSULT LETTER
[Dear  ___] : Dear  [unfilled], [Consult Letter:] : I had the pleasure of evaluating your patient, [unfilled]. [Please see my note below.] : Please see my note below. [Consult Closing:] : Thank you very much for allowing me to participate in the care of this patient.  If you have any questions, please do not hesitate to contact me. [Sincerely,] : Sincerely, [FreeTextEntry3] : Shaka Fitzgerald MD Professor, Cardiovascular & Thoracic Surgery [employer] Director of the [facility]  Director of Thoracic Surgery, [address] [place] [address] Phone: [phone] Fax: [phone]

## 2024-03-11 NOTE — REVIEW OF SYSTEMS
[As Noted in HPI] : as noted in HPI [Negative] : Heme/Lymph [Recent Weight Loss (___ Lbs)] : no recent weight loss [Abdominal Pain] : no abdominal pain [Vomiting] : no vomiting [Heartburn] : no heartburn

## 2024-03-11 NOTE — ASSESSMENT
[FreeTextEntry1] : 62-year-old M, nonsmoker, with PMHx of myasthenia gravis (s/p RVATS thymectomy in 2018). He was referred by Dr. Robert Bang for evaluation of gastric adenocarcinoma at [facility], EUS staging T1BN0. Patient had respiratory failure in 2018 s/p thymectomy requiring tracheostomy and PET tube placement.  On 12/01/2023, patient underwent EGD, Laparoscopy, robotic-assisted, Subtotal gastrectomy, Pato-en-Y gastrojejunal reconstruction, Abdominal lymph node dissection, Laparoscopic lysis of adhesions and take-down of the previous percutaneous endoscopic gastrostomy tube site.   Surgical pathology revealed pT1aN0, [facility] adenocarcinoma (Tubular), G2, moderately differentiated. All margins negative. Preop PET on 11/22/23 revealed no distant metastasis. Patient therefore has stage IA gastric adenocarcinoma. No adjuvant treatment is needed per NCCN guideline.  Post-operatively, patient is doing well, weight is stable, incisions are healing well. He presents today for a follow up visit to discuss his recovery.   Patient is recovering as expected. Will plan to begin surveillance 6 months from surgery ( June 2024).  Will call the patient to review the results. He was advised that he can advance his diet as tolerated, states he has not done so yet due to nervousness but is not experiencing any dysphasia. No other issues reported at this time. He will call the office if any questions arise.   Plan: -CT abdomen/pelvis w/ oral and IVC for surveillance in June.   I, Dr. Shaka Fitzgerald MD, personally performed the evaluation and management (E/M) services for this established patient who presents today with (a) new problem(s)/exacerbation of (an) existing condition(s).  That E/M includes conducting the clinically appropriate interval history &/or exam, assessing all new/exacerbated conditions, and establishing a new plan of care.  Today, my TAYLOR, Letty Guido NP, was here to observe &/or participate in the visit & follow plan of care established by me.

## 2024-03-11 NOTE — PHYSICAL EXAM
[Respiration, Rhythm And Depth] : normal respiratory rhythm and effort [Exaggerated Use Of Accessory Muscles For Inspiration] : no accessory muscle use [Heart Rate And Rhythm] : heart rate was normal and rhythm regular [Heart Sounds Gallop] : no gallops [Heart Sounds] : normal S1 and S2 [Murmurs] : no murmurs [Heart Sounds Pericardial Friction Rub] : no pericardial rub [Examination Of The Chest] : the chest was normal in appearance [Chest Visual Inspection Thoracic Asymmetry] : no chest asymmetry [Diminished Respiratory Excursion] : normal chest expansion [Abdomen Soft] : soft [Bowel Sounds] : normal bowel sounds [Abdomen Tenderness] : non-tender [Cervical Lymph Nodes Enlarged Posterior Bilaterally] : posterior cervical [Cervical Lymph Nodes Enlarged Anterior Bilaterally] : anterior cervical [Supraclavicular Lymph Nodes Enlarged Bilaterally] : supraclavicular [Axillary Lymph Nodes Enlarged Bilaterally] : axillary [No CVA Tenderness] : no ~M costovertebral angle tenderness [Abnormal Walk] : normal gait [No Spinal Tenderness] : no spinal tenderness [Nail Clubbing] : no clubbing  or cyanosis of the fingernails [Musculoskeletal - Swelling] : no joint swelling seen [Motor Tone] : muscle strength and tone were normal [Skin Color & Pigmentation] : normal skin color and pigmentation [Skin Turgor] : normal skin turgor [] : no rash [Sensation] : the sensory exam was normal to light touch and pinprick [Deep Tendon Reflexes (DTR)] : deep tendon reflexes were 2+ and symmetric [No Focal Deficits] : no focal deficits [Oriented To Time, Place, And Person] : oriented to person, place, and time [Impaired Insight] : insight and judgment were intact [Affect] : the affect was normal

## 2024-03-11 NOTE — HISTORY OF PRESENT ILLNESS
[FreeTextEntry1] : 63-year-old M, nonsmoker, with PMHx of myasthenia gravis (s/p RVATS thymectomy in 2018). He was referred by Dr. Robert Bang for evaluation of gastric adenocarcinoma at [facility], EUS staging T1BN0. Patient had respiratory failure in 2018 s/p thymectomy requiring tracheostomy and PET tube placement.  On 12/01/2023, patient underwent EGD, Laparoscopy, robotic-assisted, Subtotal gastrectomy, Pato-en-Y gastrojejunal reconstruction, Abdominal lymph node dissection, Laparoscopic lysis of adhesions and take-down of the previous percutaneous endoscopic gastrostomy tube site.   Surgical pathology revealed pT1aN0, [facility] adenocarcinoma (Tubular), G2, moderately differentiated. All margins negative. Preop PET on 11/22/23 revealed no distant metastasis. Patient therefore has stage IA gastric adenocarcinoma. No adjuvant treatment is needed per NCCN guideline.  Post-operatively, patient is doing well, weight is stable, incisions are healing well. He presents today for a follow up visit to discuss his recovery.   Patient reports that he is feeling well today. He is able to eat small soft meals soft without issue. Denies GERD and is currently taking a PPI. He reports that his weight is stable and the site has healed well.

## 2024-06-13 ENCOUNTER — NON-APPOINTMENT (OUTPATIENT)
Age: 64
End: 2024-06-13

## 2024-06-19 ENCOUNTER — APPOINTMENT (OUTPATIENT)
Dept: THORACIC SURGERY | Facility: CLINIC | Age: 64
End: 2024-06-19
Payer: MEDICAID

## 2024-06-19 DIAGNOSIS — G89.18 OTHER ACUTE POSTPROCEDURAL PAIN: ICD-10-CM

## 2024-06-19 DIAGNOSIS — Z85.028 ENCOUNTER FOR FOLLOW-UP EXAMINATION AFTER COMPLETED TREATMENT FOR MALIGNANT NEOPLASM: ICD-10-CM

## 2024-06-19 DIAGNOSIS — Z09 ENCOUNTER FOR FOLLOW-UP EXAMINATION AFTER COMPLETED TREATMENT FOR CONDITIONS OTHER THAN MALIGNANT NEOPLASM: ICD-10-CM

## 2024-06-19 DIAGNOSIS — Z08 ENCOUNTER FOR FOLLOW-UP EXAMINATION AFTER COMPLETED TREATMENT FOR MALIGNANT NEOPLASM: ICD-10-CM

## 2024-06-19 PROCEDURE — 99442: CPT

## 2024-06-20 PROBLEM — Z08 ENCOUNTER FOR FOLLOW-UP SURVEILLANCE OF GASTRIC CANCER: Status: ACTIVE | Noted: 2024-06-20

## 2024-06-20 PROBLEM — G89.18 POSTOPERATIVE PAIN: Status: RESOLVED | Noted: 2023-12-12 | Resolved: 2024-06-20

## 2024-06-20 PROBLEM — Z09 POSTOP CHECK: Status: RESOLVED | Noted: 2023-12-12 | Resolved: 2024-06-20

## 2024-06-20 RX ORDER — OXYCODONE 5 MG/1
5 TABLET ORAL
Refills: 0 | Status: DISCONTINUED | COMMUNITY
End: 2024-06-20

## 2024-06-20 NOTE — ASSESSMENT
[FreeTextEntry1] : 63-year-old M, nonsmoker, with PMHx of myasthenia gravis (s/p RVATS thymectomy in 2018). He was referred by Dr. Robert Bang for evaluation of gastric adenocarcinoma at [facility], EUS staging T1BN0. Patient had respiratory failure in 2018 s/p thymectomy requiring tracheostomy and PEG tube placement.  On 12/01/2023, patient underwent Laparoscopic Subtotal gastrectomy, Pato-en-Y gastrojejunal reconstruction for Stage IA [id number], [facility] adenocarcinoma.  No adjuvant treatment is needed per NCCN guideline.  CT abd&plevis w/ contrast on 06/11/2024 was reviewed with the patient: stable postop changes without evidence of recurrent disease. Patient is doing well without any complaints.  I suggested him to follow up with Dr. Bang for EGD as needed. Will continue gastric ca. Surveillance with a CT AP in 6 months. Patient stated understanding.   Plan: follow up in 6 months

## 2024-06-20 NOTE — REASON FOR VISIT
[Home] : at home, [unfilled] , at the time of the visit. [Medical Office: ([facility])___] : at the medical office located in  [Verbal consent obtained from patient] : the patient, [unfilled] [FreeTextEntry1] : CT CAP

## 2024-06-20 NOTE — HISTORY OF PRESENT ILLNESS
[FreeTextEntry1] : 63-year-old M, nonsmoker, with PMHx of myasthenia gravis (s/p RVATS thymectomy in 2018). He was referred by Dr. Robert Bang for evaluation of gastric adenocarcinoma at [facility], EUS staging T1BN0. Patient had respiratory failure in 2018 s/p thymectomy requiring tracheostomy and PEG tube placement.  On 12/01/2023, patient underwent EGD, Laparoscopy, robotic-assisted, Subtotal gastrectomy, Pato-en-Y gastrojejunal reconstruction, Abdominal lymph node dissection, Laparoscopic lysis of adhesions and take-down of the previous percutaneous endoscopic gastrostomy tube site.  Surgical pathology revealed pT1aN0, [facility] adenocarcinoma (Tubular), G2, moderately differentiated. All margins negative. Preop PET on 11/22/23 revealed no distant metastasis. Patient therefore has stage IA gastric adenocarcinoma. No adjuvant treatment is needed per NCCN guideline.  He presents today with a CT abdomen and pelvis. Reports doing well without unintentional weight loss, nausea, vomiting, abdominal pain.   CT abd&plevis w/ contrast on 06/11/2024 - Interval distal gastrectomy with gastrojejunostomy. - No abdominal or pelvic lymphadenopathy. - Few scattered colonic diverticula.

## 2024-12-23 ENCOUNTER — NON-APPOINTMENT (OUTPATIENT)
Age: 64
End: 2024-12-23

## 2024-12-23 DIAGNOSIS — R91.1 SOLITARY PULMONARY NODULE: ICD-10-CM

## 2025-06-25 ENCOUNTER — APPOINTMENT (OUTPATIENT)
Dept: THORACIC SURGERY | Facility: CLINIC | Age: 65
End: 2025-06-25
Payer: MEDICARE

## 2025-06-25 ENCOUNTER — NON-APPOINTMENT (OUTPATIENT)
Age: 65
End: 2025-06-25

## 2025-06-25 PROCEDURE — 99213 OFFICE O/P EST LOW 20 MIN: CPT | Mod: 93

## 2025-06-25 NOTE — HISTORY OF PRESENT ILLNESS
[FreeTextEntry1] : 64-year-old M, nonsmoker, with PMHx of myasthenia gravis (s/p RVATS thymectomy in 2018). He was referred by Dr. Josafat Silva for evaluation of gastric adenocarcinoma at [facility], EUS staging T1BN0. Patient had respiratory failure in 2018 s/p thymectomy requiring tracheostomy and PEG tube placement.  On 12/01/2023, patient underwent EGD, Laparoscopy, robotic-assisted, Subtotal gastrectomy, Luis-en-Y gastrojejunal reconstruction, Abdominal lymph node dissection, Laparoscopic lysis of adhesions and take-down of the previous percutaneous endoscopic gastrostomy tube site.  Surgical pathology revealed pT1aN0, [facility] adenocarcinoma (Tubular), G2, moderately differentiated. All margins negative. Preop PET on 11/22/23 revealed no distant metastasis. Patient therefore has stage IA gastric adenocarcinoma. No adjuvant treatment is needed per NCCN guideline.  He presents today with a CT chest, abdomen and pelvis. Reports doing well without unintentional weight loss, nausea, vomiting, abdominal pain.  CT CAP with contrast on 06/17/2025 - Status post distal gastrectomy with gastrojejunostomy.  - Stable subcentimeter pulmonary nodules dating back to 10/2018. - No lymphadenopathy in the chest, abdomen, or pelvis. - Evidence of prior granulomatous disease in the chest. - Sigmoid colonic diverticula.

## 2025-06-25 NOTE — ASSESSMENT
[FreeTextEntry1] : 64-year-old M, nonsmoker, with PMHx of myasthenia gravis (s/p RVATS thymectomy in 2018). He [facility] T1BN[facility].   On 12/01/2023, patient underwent EGD, Laparoscopy, robotic-assisted, Subtotal gastrectomy, Luis-en-Y gastrojejunal reconstruction, Abdominal lymph node dissection, Laparoscopic lysis of adhesions and take-down of the previous percutaneous endoscopic gastrostomy tube site. Surgical pathology revealed pT1aN0, [facility] adenocarcinoma (Tubular), G2, moderately differentiated. All margins negative. Patient therefore has stage IA gastric adenocarcinoma. No adjuvant treatment is needed per NCCN guideline.  Surveillance CT CAP w/ IC completed on 06/17/2025 was reviewed and discussed with the patient: stable multiple pulmonary nodules, stable post surgical changes from the distal gastrectomy. No concerns of metastatic or recurrent disease.  I suggested the patient to follow up Dr. Josafat Silva for EGD as needed. Will arrange another Surveillance scan in one year.    Plan: CT chest and abdomen in one year

## 2025-06-25 NOTE — REASON FOR VISIT
[Home] : at home, [unfilled] , at the time of the visit. [Medical Office: ([facility])___] : at the medical office located in  [Telephone (audio)] : This telephonic visit was provided via audio only technology. [Verbal consent obtained from patient] : the patient, [unfilled] [Follow-Up: _____] : a [unfilled] follow-up visit

## 2025-06-27 NOTE — HISTORY OF PRESENT ILLNESS
[facility] Medication Refill Request Information:  * Please contact your pharmacy regarding ANY request for medication refills.  ** [facility] Prescription Fax = [phone]  * Please allow 3 business days for routine medication refills.  * Please allow 5 business days for controlled substance medication refills.     [facility] Test Result notification information:  *You will be notified with in 7-10 days of your appointment day regarding the results of your test.  If you are on MyChart you will be notified as soon as the provider has reviewed the results and signed off on them.    [facility]: [phone]       
[FreeTextEntry1] : 64-year-old M, nonsmoker, with PMHx of myasthenia gravis (s/p RVATS thymectomy in 2018). He was referred by Dr. Josafat Silva for evaluation of gastric adenocarcinoma at [facility], EUS staging T1BN0. Patient had respiratory failure in 2018 s/p thymectomy requiring tracheostomy and PEG tube placement.  On 12/01/2023, patient underwent EGD, Laparoscopy, robotic-assisted, Subtotal gastrectomy, Luis-en-Y gastrojejunal reconstruction, Abdominal lymph node dissection, Laparoscopic lysis of adhesions and take-down of the previous percutaneous endoscopic gastrostomy tube site.  Surgical pathology revealed pT1aN0, [facility] adenocarcinoma (Tubular), G2, moderately differentiated. All margins negative. Preop PET on 11/22/23 revealed no distant metastasis. Patient therefore has stage IA gastric adenocarcinoma. No adjuvant treatment is needed per NCCN guideline.  He presents today with a CT chest, abdomen and pelvis. Reports doing well without unintentional weight loss, nausea, vomiting, abdominal pain.  CT CAP with contrast on 06/17/2025 - Status post distal gastrectomy with gastrojejunostomy.  - Stable subcentimeter pulmonary nodules dating back to 10/2018. - No lymphadenopathy in the chest, abdomen, or pelvis. - Evidence of prior granulomatous disease in the chest. - Sigmoid colonic diverticula.

## 2025-06-27 NOTE — ASSESSMENT
[FreeTextEntry1] : 64-year-old M, nonsmoker, with PMHx of myasthenia gravis (s/p RVATS thymectomy in 2018). He was [facility] Jos[facility] staging T1BN0.   On 12/01/2023, patient underwent EGD, Laparoscopy, robotic-assisted, Subtotal gastrectomy, Luis-en-Y gastrojejunal reconstruction, Abdominal lymph node dissection, Laparoscopic lysis of adhesions and take-down of the previous percutaneous endoscopic gastrostomy tube site. Surgical pathology revealed pT1aN0, [facility] adenocarcinoma (Tubular), G2, moderately differentiated. All margins negative. Patient therefore has stage IA gastric adenocarcinoma. No adjuvant treatment is needed per NCCN guideline.  Surveillance CT CAP w/ IC completed on 06/17/2025 was reviewed and discussed with the patient: stable multiple pulmonary nodules, stable post surgical changes from the distal gastrectomy. No concerns of metastatic or recurrent disease.  I suggested the patient to follow up Dr. Josafat Silva for EGD as needed. Will arrange another Surveillance scan in one year.    Plan: CT chest and abdomen in one year

## (undated) DEVICE — ENDOCATCH GENERAL 10MM (PURPLE)

## (undated) DEVICE — WARMING BLANKET UPPER ADULT

## (undated) DEVICE — TROCAR GELPOINT MINI ADVANCED

## (undated) DEVICE — Device

## (undated) DEVICE — SOL IRR BAG NS 0.9% 1000ML

## (undated) DEVICE — XI SEAL UNIV 5- 8 MM

## (undated) DEVICE — LUBRICATING JELLY ONESHOT 1.25OZ

## (undated) DEVICE — STAPLER COVIDIEN ENDO GIA STANDARD HANDLE

## (undated) DEVICE — VENODYNE/SCD SLEEVE CALF MEDIUM

## (undated) DEVICE — BITE BLOCK ADULT 20 X 27MM (GREEN)

## (undated) DEVICE — TROCAR SURGIQUEST AIRSEAL 12MMX100MM

## (undated) DEVICE — XI STAPLER ENDOWRIST 45

## (undated) DEVICE — KIT ENDO PROCEDURE CUST W/VLV

## (undated) DEVICE — TROCAR SURGIQUEST AIRSEAL 5MM X 100MM

## (undated) DEVICE — MARKING PEN W RULER

## (undated) DEVICE — D HELP - CLEARVIEW CLEARIFY SYSTEM

## (undated) DEVICE — PACK ROBOTIC

## (undated) DEVICE — SUT PROLENE 2-0 30" SH

## (undated) DEVICE — SUT SILK 0 30" SH

## (undated) DEVICE — GELPORT LAPAROSCOPIC SYSTEM

## (undated) DEVICE — URETERAL CATH RED RUBBER 14FR (GREEN)

## (undated) DEVICE — GUN DIL ALLIANCE

## (undated) DEVICE — TUBING STRYKER PNEUMOCLEAR HEAT HUMID

## (undated) DEVICE — TROCAR ETHICON ENDOPATH XCEL BLADELESS 15MM X 100MM STABILITY

## (undated) DEVICE — WARMING BLANKET LOWER ADULT

## (undated) DEVICE — XI ENDOWRIST 12 - 8 MM CANNULA REDUCER

## (undated) DEVICE — DRAPE TOWEL BLUE 17" X 24"

## (undated) DEVICE — STERIS DEFENDO 3-PIECE KIT (AIR/WATER, SUCTION & BIOPSY VALVES)

## (undated) DEVICE — XI DRAPE COLUMN

## (undated) DEVICE — TUBING AIRSEAL TRI-LUMEN FILTERED

## (undated) DEVICE — XI DRAPE ARM

## (undated) DEVICE — DRAPE 3/4 SHEET 52X76"

## (undated) DEVICE — SUT SILK 2-0 30" SH (POP-OFF)

## (undated) DEVICE — GLV 7.5 PROTEXIS (WHITE)

## (undated) DEVICE — BIOPSY FORCEP RADIAL JAW 4 STANDARD WITH NEEDLE

## (undated) DEVICE — TRAY IRRIGATION SYR BULB 60CC

## (undated) DEVICE — SUT PDS II PLUS 0 36" CT-1

## (undated) DEVICE — SUT SILK 2-0 30" SH

## (undated) DEVICE — XI VESSEL SEALER

## (undated) DEVICE — DRAIN PENROSE .25" X 18" LATEX

## (undated) DEVICE — DRSG GAUZE PACKTNER ROLL

## (undated) DEVICE — ENDOCATCH GENERAL 15MM (PURPLE)

## (undated) DEVICE — TROCAR ETHICON ENDOPATH XCEL BLADELESS 5MM X 100MM STABILITY